# Patient Record
Sex: FEMALE | Race: WHITE | NOT HISPANIC OR LATINO | URBAN - METROPOLITAN AREA
[De-identification: names, ages, dates, MRNs, and addresses within clinical notes are randomized per-mention and may not be internally consistent; named-entity substitution may affect disease eponyms.]

---

## 2017-01-24 ENCOUNTER — ON CAMPUS - OUTPATIENT (AMBULATORY)
Dept: URBAN - METROPOLITAN AREA HOSPITAL 2 | Facility: HOSPITAL | Age: 62
End: 2017-01-24

## 2017-01-24 VITALS
SYSTOLIC BLOOD PRESSURE: 141 MMHG | OXYGEN SATURATION: 100 % | DIASTOLIC BLOOD PRESSURE: 69 MMHG | RESPIRATION RATE: 14 BRPM | SYSTOLIC BLOOD PRESSURE: 129 MMHG | DIASTOLIC BLOOD PRESSURE: 102 MMHG | HEART RATE: 76 BPM | DIASTOLIC BLOOD PRESSURE: 84 MMHG | OXYGEN SATURATION: 95 % | DIASTOLIC BLOOD PRESSURE: 71 MMHG | WEIGHT: 148 LBS | DIASTOLIC BLOOD PRESSURE: 73 MMHG | HEART RATE: 80 BPM | SYSTOLIC BLOOD PRESSURE: 144 MMHG | SYSTOLIC BLOOD PRESSURE: 143 MMHG | DIASTOLIC BLOOD PRESSURE: 74 MMHG | HEART RATE: 86 BPM | SYSTOLIC BLOOD PRESSURE: 146 MMHG | DIASTOLIC BLOOD PRESSURE: 78 MMHG | RESPIRATION RATE: 18 BRPM | HEART RATE: 88 BPM | TEMPERATURE: 97.9 F | OXYGEN SATURATION: 98 % | HEART RATE: 75 BPM | RESPIRATION RATE: 16 BRPM | HEART RATE: 109 BPM | HEART RATE: 77 BPM | OXYGEN SATURATION: 96 % | SYSTOLIC BLOOD PRESSURE: 133 MMHG | HEIGHT: 64 IN | DIASTOLIC BLOOD PRESSURE: 89 MMHG | OXYGEN SATURATION: 97 % | HEART RATE: 95 BPM | SYSTOLIC BLOOD PRESSURE: 145 MMHG | SYSTOLIC BLOOD PRESSURE: 162 MMHG | SYSTOLIC BLOOD PRESSURE: 147 MMHG

## 2017-01-24 DIAGNOSIS — Z86.010 PERSONAL HISTORY OF COLONIC POLYPS: ICD-10-CM

## 2017-01-24 DIAGNOSIS — R13.10 DYSPHAGIA, UNSPECIFIED: ICD-10-CM

## 2017-01-24 DIAGNOSIS — K21.9 GASTRO-ESOPHAGEAL REFLUX DISEASE WITHOUT ESOPHAGITIS: ICD-10-CM

## 2017-01-24 DIAGNOSIS — K64.1 SECOND DEGREE HEMORRHOIDS: ICD-10-CM

## 2017-01-24 PROCEDURE — 43450 DILATE ESOPHAGUS 1/MULT PASS: CPT | Performed by: INTERNAL MEDICINE

## 2017-01-24 PROCEDURE — 43235 EGD DIAGNOSTIC BRUSH WASH: CPT | Performed by: INTERNAL MEDICINE

## 2017-01-24 PROCEDURE — 45378 DIAGNOSTIC COLONOSCOPY: CPT | Mod: PT | Performed by: INTERNAL MEDICINE

## 2017-01-24 RX ADMIN — PROPOFOL: 10 INJECTION, EMULSION INTRAVENOUS at 08:47

## 2017-06-13 ENCOUNTER — HOSPITAL ENCOUNTER (OUTPATIENT)
Dept: ULTRASOUND IMAGING | Facility: HOSPITAL | Age: 62
Discharge: HOME | End: 2017-06-13
Attending: INTERNAL MEDICINE | Admitting: INTERNAL MEDICINE

## 2018-01-02 ENCOUNTER — HOSPITAL ENCOUNTER (OUTPATIENT)
Dept: MAMMOGRAPHY | Facility: HOSPITAL | Age: 63
Discharge: HOME | End: 2018-01-02
Attending: INTERNAL MEDICINE | Admitting: INTERNAL MEDICINE

## 2018-09-21 ENCOUNTER — HOSPITAL ENCOUNTER (OUTPATIENT)
Dept: ULTRASOUND IMAGING | Facility: HOSPITAL | Age: 63
Discharge: HOME | End: 2018-09-21
Attending: INTERNAL MEDICINE | Admitting: INTERNAL MEDICINE

## 2019-01-18 ENCOUNTER — HOSPITAL ENCOUNTER (OUTPATIENT)
Dept: MAMMOGRAPHY | Facility: HOSPITAL | Age: 64
Discharge: HOME | End: 2019-01-18
Attending: INTERNAL MEDICINE | Admitting: INTERNAL MEDICINE

## 2019-01-28 ENCOUNTER — HOSPITAL ENCOUNTER (OUTPATIENT)
Dept: MAMMOGRAPHY | Facility: HOSPITAL | Age: 64
Discharge: HOME | End: 2019-01-28
Attending: INTERNAL MEDICINE | Admitting: INTERNAL MEDICINE

## 2019-06-18 ENCOUNTER — TRANSCRIBE ORDERS (OUTPATIENT)
Dept: ADMINISTRATIVE | Facility: HOSPITAL | Age: 64
End: 2019-06-18

## 2019-06-18 DIAGNOSIS — R92.8 FOLLOW-UP EXAMINATION OF ABNORMAL MAMMOGRAM: Primary | ICD-10-CM

## 2019-07-24 ENCOUNTER — HOSPITAL ENCOUNTER (OUTPATIENT)
Dept: MAMMOGRAPHY | Facility: HOSPITAL | Age: 64
Discharge: HOME | End: 2019-07-24
Admitting: OBSTETRICS & GYNECOLOGY

## 2019-07-24 DIAGNOSIS — R92.8 FOLLOW-UP EXAMINATION OF ABNORMAL MAMMOGRAM: ICD-10-CM

## 2019-07-24 PROCEDURE — G0279 TOMOSYNTHESIS, MAMMO: HCPCS

## 2019-07-24 PROCEDURE — 77065 DX MAMMO INCL CAD UNI: CPT

## 2019-08-02 ENCOUNTER — TRANSCRIBE ORDERS (OUTPATIENT)
Dept: ADMINISTRATIVE | Facility: HOSPITAL | Age: 64
End: 2019-08-02

## 2019-08-02 DIAGNOSIS — N63.10 BREAST MASS, RIGHT: Primary | ICD-10-CM

## 2019-08-02 DIAGNOSIS — Z09 FOLLOW-UP EXAM, 3-6 MONTHS SINCE PREVIOUS EXAM: ICD-10-CM

## 2019-08-07 ENCOUNTER — APPOINTMENT (OUTPATIENT)
Dept: MAMMOGRAPHY | Facility: HOSPITAL | Age: 64
End: 2019-08-07

## 2019-10-17 ENCOUNTER — TRANSCRIBE ORDERS (OUTPATIENT)
Dept: PHYSICAL THERAPY | Facility: CLINIC | Age: 64
End: 2019-10-17

## 2019-10-17 DIAGNOSIS — S39.012A STRAIN OF LUMBAR REGION, INITIAL ENCOUNTER: ICD-10-CM

## 2019-10-17 DIAGNOSIS — M51.36 DDD (DEGENERATIVE DISC DISEASE), LUMBAR: Primary | ICD-10-CM

## 2019-10-28 ENCOUNTER — TREATMENT (OUTPATIENT)
Dept: PHYSICAL THERAPY | Facility: CLINIC | Age: 64
End: 2019-10-28

## 2019-10-28 DIAGNOSIS — M51.36 DDD (DEGENERATIVE DISC DISEASE), LUMBAR: Primary | ICD-10-CM

## 2019-10-28 PROCEDURE — 97162 PT EVAL MOD COMPLEX 30 MIN: CPT | Performed by: PHYSICAL THERAPIST

## 2019-10-28 PROCEDURE — 97110 THERAPEUTIC EXERCISES: CPT | Performed by: PHYSICAL THERAPIST

## 2019-10-28 PROCEDURE — 97012 MECHANICAL TRACTION THERAPY: CPT | Performed by: PHYSICAL THERAPIST

## 2019-10-28 NOTE — PROGRESS NOTES
"  Physical Therapy Initial Evaluation and Plan of Care    Patient: Courtney Reis   : 1955  Diagnosis/ICD-10 Code:  DDD (degenerative disc disease), lumbar [M51.36]  Referring practitioner: Kevin Nagel MD  Date of Initial Visit: 10/28/2019  Today's Date: 10/28/2019  Patient seen for 1 sessions           Subjective Questionnaire: Oswestry: 50%       Subjective Evaluation    History of Present Illness  Mechanism of injury: Patient presents physical therapy with chief complaint of lower back and LLE pain.  Patient states that she began having symptoms around the beginning of September.  Reports that she has noticed pain getting worse, as well as being worse at the end of the day.  Patient reports taking anti-inflammatory medication with some relief.  Patient reports prior \"pinched nerve\" in cervical spine, however no other lower back injuries.  Patient reports that she also has Lupus and Fibromyalgia.     Pain  Current pain ratin  At worst pain ratin  Quality: tight and cramping  Relieving factors: medications and heat  Aggravating factors: sleeping, prolonged positioning, squatting, lifting, movement and standing  Progression: worsening    Diagnostic Tests  No diagnostic tests performed    Patient Goals  Patient goals for therapy: decreased pain, return to sport/leisure activities and independence with ADLs/IADLs             Objective       Neurological Testing     Additional Neurological Details  Dermatomes:  Wnl     Active Range of Motion     Additional Active Range of Motion Details  Lumbar AROM:    Flexion:  Full AROM, pain on left in lumbar spine  L and R SB:  25% limited and painful both directions  Extension:  50% limited and painful     Strength/Myotome Testing     Left Hip   Planes of Motion   Flexion: 5  Extension: 5  Abduction: 5  Adduction: 5  External rotation: 5  Internal rotation: 5    Right Hip   Planes of Motion   Flexion: 5  Extension: 5  Abduction: 5  Adduction: 5  External " rotation: 5  Internal rotation: 5    Left Knee   Flexion: 5  Extension: 5    Right Knee   Flexion: 5  Extension: 5    Left Ankle/Foot   Dorsiflexion: 5  Plantar flexion: 5  Great toe extension: 4    Right Ankle/Foot   Dorsiflexion: 5  Plantar flexion: 5  Great toe extension: 4-    Tests     Lumbar     Left   Positive passive SLR and quadrant.     Right   Positive passive SLR and quadrant.     Additional Tests Details  TTP with UPA to right L4 and L5  TTP with CPA to L4 and L5         Assessment & Plan     Assessment  Impairments: abnormal or restricted ROM, impaired physical strength, lacks appropriate home exercise program and pain with function  Assessment details: Patient presents to physical therapy with s/s congruent with MD diagnosis of DDD of lumbar spine.  Patient demonstrates restricted and painful AROM.  Patient also demonstrates mild weakness in BLE's.  Patient reports diffculty with sleeping and most ADL's.  Patient would benefit from physical therapy intervention in order to address these deficits so that she may return to ADL's with less pain/limitation.  Prognosis: good  Functional Limitations: lifting, sleeping, walking, pulling, pushing, uncomfortable because of pain and standing  Goals  Plan Goals: In two weeks, patient will report at least 25% reduction in pain level.    In two weeks, patient will demonstrate at least 25% improvement in AROM in  lumbar spine.     In four weeks, patient will demonstrate compliance with HEP.  In four weeks, patient will demonstrate lumbar AROM WFL without pain level over 2/10.  In four weeks, patient will demonstrate muscular strength of 5/5 in BLE's.   In four weeks, patient will demonstrate decreased perceived disability by decreasing score on Oswestry by at least 12%.    Plan  Therapy options: will be seen for skilled physical therapy services  Planned modality interventions: traction, thermotherapy (hydrocollator packs), TENS and cryotherapy  Planned therapy  interventions: abdominal trunk stabilization, home exercise program, joint mobilization, manual therapy, neuromuscular re-education, soft tissue mobilization, spinal/joint mobilization and strengthening  Frequency: 2x week  Plan details: 30 visits or 90 day recertification.         Manual Therapy:         mins  25083;  Therapeutic Exercise:    15     mins  36177;     Neuromuscular Jazzmine:        mins  34871;    Therapeutic Activity:          mins  62524;     Gait Training:           mins  93306;     Ultrasound:          mins  50750;    Electrical Stimulation:         mins  05210 ( );  Dry Needling          mins self-pay  Traction   15 mins 78241    Timed Treatment:   15   mins   Total Treatment:     55   mins    PT SIGNATURE: Laith Vences PT   DATE TREATMENT INITIATED: 10/28/2019    Initial Certification  Certification Period: 1/26/2020  I certify that the therapy services are furnished while this patient is under my care.  The services outlined above are required by this patient, and will be reviewed every 90 days.     PHYSICIAN: Kevin Nagel MD      DATE:     Please sign and return via fax to [phone].. Thank you, [facility].

## 2019-10-30 ENCOUNTER — TREATMENT (OUTPATIENT)
Dept: PHYSICAL THERAPY | Facility: CLINIC | Age: 64
End: 2019-10-30

## 2019-10-30 DIAGNOSIS — M51.36 DDD (DEGENERATIVE DISC DISEASE), LUMBAR: Primary | ICD-10-CM

## 2019-10-30 PROCEDURE — 97110 THERAPEUTIC EXERCISES: CPT | Performed by: PHYSICAL THERAPIST

## 2019-10-30 PROCEDURE — 97012 MECHANICAL TRACTION THERAPY: CPT | Performed by: PHYSICAL THERAPIST

## 2019-10-30 NOTE — PROGRESS NOTES
Physical Therapy Daily Progress Note      Patient: Courtney Reis   : 1955  Diagnosis/ICD-10 Code:  DDD (degenerative disc disease), lumbar [M51.36]  Referring practitioner: Kevin Nagel MD  Date of Initial Visit: Type: THERAPY  Noted: 10/28/2019  Today's Date: 10/30/2019  Patient seen for 2 sessions         Courtney Reis reports:  Decreased pain in lumbar spine reported after previous treatment.  Still some discomfort in left leg and lumbar spine      Objective   See Exercise, Manual, and Modality Logs for complete treatment.       Assessment/Plan     Decreased pain reported post treatment.  Patient tolerates core stabilization exercise without increase in symptoms.     Progress per Plan of Care           Manual Therapy:         mins  79538;  Therapeutic Exercise:    10     mins  67952;     Neuromuscular Jazzmine:        mins  60068;    Therapeutic Activity:          mins  64549;     Gait Training:           mins  89937;     Ultrasound:          mins  52691;    Electrical Stimulation:         mins  71686 ( );  Dry Needling          mins self-pay  Traction  15  mins  25916    Timed Treatment:   25   mins   Total Treatment:     25   mins    Laith Vences PT  Physical Therapist

## 2019-11-04 ENCOUNTER — TREATMENT (OUTPATIENT)
Dept: PHYSICAL THERAPY | Facility: CLINIC | Age: 64
End: 2019-11-04

## 2019-11-04 DIAGNOSIS — M51.36 DDD (DEGENERATIVE DISC DISEASE), LUMBAR: Primary | ICD-10-CM

## 2019-11-04 PROCEDURE — 97012 MECHANICAL TRACTION THERAPY: CPT | Performed by: PHYSICAL THERAPIST

## 2019-11-04 PROCEDURE — G0283 ELEC STIM OTHER THAN WOUND: HCPCS | Performed by: PHYSICAL THERAPIST

## 2019-11-04 PROCEDURE — 97110 THERAPEUTIC EXERCISES: CPT | Performed by: PHYSICAL THERAPIST

## 2019-11-04 NOTE — PROGRESS NOTES
Physical Therapy Daily Progress Note    VISIT#: 3    Subjective   Courtney Reis reports: that traction didn't seem to help as much last time as the time before. She had a little more discomfort after this last visit.       Objective     See Exercise, Manual, and Modality Logs for complete treatment.       Assessment & Plan     Assessment  Assessment details: Pt wanted to continue traction this date and weight was lowered. Pt seemed to tolerated traction well. Ability to perform core stabilization improved with stabilizer and pt cued to isolate lower abdominals. Pt tolerated e-stim and heat well at end of session.           Progress per Plan of Care            Timed:         Manual Therapy:         mins  98391;     Therapeutic Exercise:    10     mins  97915;     Neuromuscular Jazzmine:        mins  33155;    Therapeutic Activity:          mins  67423;     Gait Training:           mins  15631;     Ultrasound:          mins  90748;    Ionto                                   mins   68029  Self Care                            mins   43907    Un-Timed:  Electrical Stimulation:    15     mins  73659 ( );  Traction     15     mins 33059    Timed Treatment:   10   mins   Total Treatment:     40   mins    Puja Jeffries, PT, DPT, OCS  [place] License # [id number]  Physical Therapist

## 2019-11-06 ENCOUNTER — TREATMENT (OUTPATIENT)
Dept: PHYSICAL THERAPY | Facility: CLINIC | Age: 64
End: 2019-11-06

## 2019-11-06 DIAGNOSIS — M51.36 DDD (DEGENERATIVE DISC DISEASE), LUMBAR: Primary | ICD-10-CM

## 2019-11-06 PROCEDURE — G0283 ELEC STIM OTHER THAN WOUND: HCPCS | Performed by: PHYSICAL THERAPIST

## 2019-11-06 PROCEDURE — 97012 MECHANICAL TRACTION THERAPY: CPT | Performed by: PHYSICAL THERAPIST

## 2019-11-06 NOTE — PROGRESS NOTES
Physical Therapy Daily Progress Note    VISIT#: 4    Subjective   Courtney Reis reports: that she is hurting this morning because mornings are usually the worst. She felt better after last time compared to the time before and would like to do lower weight on traction again.       Objective     See Exercise, Manual, and Modality Logs for complete treatment.       Assessment & Plan     Assessment  Assessment details: Pt demonstrates some decreased pain after traction. Pt tolerated treatment well. Pt performed exercises at home this morning and did not want to do them at therapy today.           Progress per Plan of Care            Timed:         Manual Therapy:         mins  73244;     Therapeutic Exercise:        mins  68711;     Neuromuscular Jazzmine:        mins  48393;    Therapeutic Activity:          mins  09523;     Gait Training:           mins  86035;     Ultrasound:          mins  24717;    Ionto                                   mins   73198  Self Care                            mins   92565    Un-Timed:  Electrical Stimulation:    15     mins  92790 ( );  Traction     15     mins 52773    Timed Treatment:   0  mins   Total Treatment:     30   mins    Puja Jeffries, PT, DPT, OCS  [place] License # [id number]  Physical Therapist

## 2019-11-11 ENCOUNTER — TREATMENT (OUTPATIENT)
Dept: PHYSICAL THERAPY | Facility: CLINIC | Age: 64
End: 2019-11-11

## 2019-11-11 DIAGNOSIS — M51.36 DDD (DEGENERATIVE DISC DISEASE), LUMBAR: Primary | ICD-10-CM

## 2019-11-11 PROCEDURE — 97012 MECHANICAL TRACTION THERAPY: CPT | Performed by: PHYSICAL THERAPIST

## 2019-11-11 NOTE — PROGRESS NOTES
Physical Therapy Daily Progress Note    Patient: Courtney Reis   : 1955  Diagnosis/ICD-10 Code:  DDD (degenerative disc disease), lumbar [M51.36]  Referring practitioner: Kevin Nagel MD  Date of Initial Visit: Type: THERAPY  Noted: 10/28/2019  Today's Date: 2019  Patient seen for 5 sessions         Courtney Reis reports:  Feeling better after last week.  Reports that she has appointment with MD today and wishes to only complete mechanical traction treatment today.  Patient reports that she has MRI scheduled for this week to confirm diagnosis.     Objective   See Exercise, Manual, and Modality Logs for complete treatment.       Assessment/Plan     Tolerates traction well.  Patient on track to meeting pain goals.  Patient also compliant with HEP.   Progress per Plan of Care           Manual Therapy:         mins  96023;  Therapeutic Exercise:         mins  42771;     Neuromuscular Jazzmine:        mins  51411;    Therapeutic Activity:          mins  50374;     Gait Training:           mins  23934;     Ultrasound:          mins  71207;    Electrical Stimulation:         mins  80561 ( );  Dry Needling          mins self-pay  Traction  15  mins 46691    Timed Treatment:   0   mins   Total Treatment:     15   mins    Laith Vences PT  Physical Therapist

## 2019-11-20 ENCOUNTER — TREATMENT (OUTPATIENT)
Dept: PHYSICAL THERAPY | Facility: CLINIC | Age: 64
End: 2019-11-20

## 2019-11-20 DIAGNOSIS — M51.36 DDD (DEGENERATIVE DISC DISEASE), LUMBAR: Primary | ICD-10-CM

## 2019-11-20 PROCEDURE — 97110 THERAPEUTIC EXERCISES: CPT | Performed by: PHYSICAL THERAPIST

## 2019-11-20 PROCEDURE — 97012 MECHANICAL TRACTION THERAPY: CPT | Performed by: PHYSICAL THERAPIST

## 2019-11-20 NOTE — PROGRESS NOTES
Physical Therapy Daily Progress Note      Patient: Courtney Reis   : 1955  Diagnosis/ICD-10 Code:  DDD (degenerative disc disease), lumbar [M51.36]  Referring practitioner: Kevin Nagel MD  Date of Initial Visit: Type: THERAPY  Noted: 10/28/2019  Today's Date: 2019  Patient seen for 6 sessions         Courtney Reis reports:  Getting MRI and steroid injection last week.  Reports feeling better after previous PT treatment, as well as decreased pain level today.  Reports progressing from PPT to PPT with alternating leg marches exercise on HEP this week.     Objective   See Exercise, Manual, and Modality Logs for complete treatment.       Assessment/Plan     Patient demonstrates proper technique with home exercises.  Progressing well with exercise and traction.      Progress per Plan of Care           Manual Therapy:         mins  46776;  Therapeutic Exercise:    15     mins  21457;     Neuromuscular Jazzmine:        mins  33376;    Therapeutic Activity:          mins  50666;     Gait Training:           mins  53158;     Ultrasound:          mins  19838;    Electrical Stimulation:         mins  05223 (MC );  Dry Needling          mins self-pay  Traction  15 mins 61941    Timed Treatment:   15   mins   Total Treatment:     30   mins    Laith Vences PT  Physical Therapist

## 2019-12-04 ENCOUNTER — TREATMENT (OUTPATIENT)
Dept: PHYSICAL THERAPY | Facility: CLINIC | Age: 64
End: 2019-12-04

## 2019-12-04 DIAGNOSIS — M51.36 DDD (DEGENERATIVE DISC DISEASE), LUMBAR: Primary | ICD-10-CM

## 2019-12-04 PROCEDURE — 97012 MECHANICAL TRACTION THERAPY: CPT | Performed by: PHYSICAL THERAPIST

## 2019-12-04 NOTE — PROGRESS NOTES
Physical Therapy Daily Progress Note      Patient: Courtney Reis   : 1955  Diagnosis/ICD-10 Code:  DDD (degenerative disc disease), lumbar [M51.36]  Referring practitioner: Kevin Nagel MD  Date of Initial Visit: Type: THERAPY  Noted: 10/28/2019  Today's Date: 2019  Patient seen for 7 sessions         Courtney Reis reports:  Lower back has not gotten worse nor better.  Reports continued pain radiating in right hip.      Objective   See Exercise, Manual, and Modality Logs for complete treatment.       Assessment/Plan     Tolerates traction treatment well this visit.  Reviewed HEP and patient demonstrates compliance.     Progress per Plan of Care           Manual Therapy:         mins  44557;  Therapeutic Exercise:   5     mins  26371;     Neuromuscular Jazzmine:        mins  64330;    Therapeutic Activity:          mins  17282;     Gait Training:           mins  52562;     Ultrasound:          mins  10828;    Electrical Stimulation:         mins  69273 ( );  Dry Needling          mins self-pay  Traction  15 mins  32904    Timed Treatment:   5   mins   Total Treatment:     20   mins    Laith Vences PT  Physical Therapist

## 2020-06-24 ENCOUNTER — TRANSCRIBE ORDERS (OUTPATIENT)
Dept: ADMINISTRATIVE | Facility: HOSPITAL | Age: 65
End: 2020-06-24

## 2020-06-24 DIAGNOSIS — Z12.31 VISIT FOR SCREENING MAMMOGRAM: Primary | ICD-10-CM

## 2020-06-29 ENCOUNTER — APPOINTMENT (OUTPATIENT)
Dept: MAMMOGRAPHY | Facility: HOSPITAL | Age: 65
End: 2020-06-29

## 2020-07-02 ENCOUNTER — HOSPITAL ENCOUNTER (OUTPATIENT)
Dept: MAMMOGRAPHY | Facility: HOSPITAL | Age: 65
Discharge: HOME | End: 2020-07-02
Admitting: OBSTETRICS & GYNECOLOGY

## 2020-07-02 DIAGNOSIS — Z12.31 VISIT FOR SCREENING MAMMOGRAM: ICD-10-CM

## 2020-07-02 PROCEDURE — 77063 BREAST TOMOSYNTHESIS BI: CPT

## 2020-07-02 PROCEDURE — 77067 SCR MAMMO BI INCL CAD: CPT

## 2021-01-22 ENCOUNTER — TRANSCRIBE ORDERS (OUTPATIENT)
Dept: ADMINISTRATIVE | Facility: HOSPITAL | Age: 66
End: 2021-01-22

## 2021-01-22 DIAGNOSIS — E04.1 THYROID NODULE: Primary | ICD-10-CM

## 2021-06-22 ENCOUNTER — TRANSCRIBE ORDERS (OUTPATIENT)
Dept: ADMINISTRATIVE | Facility: HOSPITAL | Age: 66
End: 2021-06-22

## 2021-06-22 DIAGNOSIS — Z12.31 VISIT FOR SCREENING MAMMOGRAM: Primary | ICD-10-CM

## 2021-07-05 ENCOUNTER — HOSPITAL ENCOUNTER (OUTPATIENT)
Dept: MAMMOGRAPHY | Facility: HOSPITAL | Age: 66
Discharge: HOME | End: 2021-07-05
Admitting: OBSTETRICS & GYNECOLOGY

## 2021-07-05 DIAGNOSIS — Z12.31 VISIT FOR SCREENING MAMMOGRAM: ICD-10-CM

## 2021-07-05 PROCEDURE — 77067 SCR MAMMO BI INCL CAD: CPT

## 2021-07-05 PROCEDURE — 77063 BREAST TOMOSYNTHESIS BI: CPT

## 2021-07-09 ENCOUNTER — HOSPITAL ENCOUNTER (OUTPATIENT)
Dept: MAMMOGRAPHY | Facility: HOSPITAL | Age: 66
Discharge: HOME | End: 2021-07-09

## 2021-07-09 ENCOUNTER — HOSPITAL ENCOUNTER (OUTPATIENT)
Dept: ULTRASOUND IMAGING | Facility: HOSPITAL | Age: 66
Discharge: HOME | End: 2021-07-09

## 2021-07-09 DIAGNOSIS — R92.8 ABNORMALITY OF BOTH BREASTS ON SCREENING MAMMOGRAM: ICD-10-CM

## 2021-07-09 PROCEDURE — 76642 ULTRASOUND BREAST LIMITED: CPT

## 2021-07-09 PROCEDURE — G0279 TOMOSYNTHESIS, MAMMO: HCPCS

## 2021-07-09 PROCEDURE — 77066 DX MAMMO INCL CAD BI: CPT

## 2021-07-12 ENCOUNTER — APPOINTMENT (OUTPATIENT)
Dept: ULTRASOUND IMAGING | Facility: HOSPITAL | Age: 66
End: 2021-07-12

## 2021-12-01 ENCOUNTER — LAB (OUTPATIENT)
Dept: LAB | Facility: HOSPITAL | Age: 66
End: 2021-12-01

## 2021-12-01 ENCOUNTER — TRANSCRIBE ORDERS (OUTPATIENT)
Dept: ADMINISTRATIVE | Facility: HOSPITAL | Age: 66
End: 2021-12-01

## 2021-12-01 DIAGNOSIS — Z11.52 ENCOUNTER FOR SCREENING FOR SEVERE ACUTE RESPIRATORY SYNDROME CORONAVIRUS 2 (SARS-COV-2) INFECTION: Primary | ICD-10-CM

## 2021-12-01 DIAGNOSIS — Z11.52 ENCOUNTER FOR SCREENING FOR SEVERE ACUTE RESPIRATORY SYNDROME CORONAVIRUS 2 (SARS-COV-2) INFECTION: ICD-10-CM

## 2021-12-01 PROCEDURE — C9803 HOPD COVID-19 SPEC COLLECT: HCPCS

## 2021-12-01 PROCEDURE — U0004 COV-19 TEST NON-CDC HGH THRU: HCPCS

## 2021-12-02 LAB — SARS-COV-2 ORF1AB RESP QL NAA+PROBE: NOT DETECTED

## 2022-04-06 ENCOUNTER — ON CAMPUS - OUTPATIENT (AMBULATORY)
Dept: URBAN - METROPOLITAN AREA HOSPITAL 2 | Facility: HOSPITAL | Age: 67
End: 2022-04-06
Payer: MEDICARE

## 2022-04-06 VITALS
OXYGEN SATURATION: 96 % | HEART RATE: 79 BPM | RESPIRATION RATE: 18 BRPM | HEIGHT: 64 IN | DIASTOLIC BLOOD PRESSURE: 68 MMHG | OXYGEN SATURATION: 100 % | HEART RATE: 91 BPM | SYSTOLIC BLOOD PRESSURE: 123 MMHG | SYSTOLIC BLOOD PRESSURE: 183 MMHG | OXYGEN SATURATION: 94 % | DIASTOLIC BLOOD PRESSURE: 72 MMHG | SYSTOLIC BLOOD PRESSURE: 126 MMHG | HEART RATE: 75 BPM | HEART RATE: 61 BPM | HEART RATE: 81 BPM | SYSTOLIC BLOOD PRESSURE: 128 MMHG | DIASTOLIC BLOOD PRESSURE: 70 MMHG | DIASTOLIC BLOOD PRESSURE: 85 MMHG | OXYGEN SATURATION: 99 % | SYSTOLIC BLOOD PRESSURE: 155 MMHG | SYSTOLIC BLOOD PRESSURE: 146 MMHG | DIASTOLIC BLOOD PRESSURE: 88 MMHG | SYSTOLIC BLOOD PRESSURE: 114 MMHG | DIASTOLIC BLOOD PRESSURE: 80 MMHG | OXYGEN SATURATION: 98 % | SYSTOLIC BLOOD PRESSURE: 151 MMHG | HEART RATE: 83 BPM | DIASTOLIC BLOOD PRESSURE: 76 MMHG | RESPIRATION RATE: 17 BRPM | RESPIRATION RATE: 16 BRPM | HEART RATE: 77 BPM | HEART RATE: 71 BPM | HEART RATE: 98 BPM | TEMPERATURE: 97.5 F | DIASTOLIC BLOOD PRESSURE: 94 MMHG | SYSTOLIC BLOOD PRESSURE: 129 MMHG | WEIGHT: 152 LBS | SYSTOLIC BLOOD PRESSURE: 161 MMHG | RESPIRATION RATE: 12 BRPM

## 2022-04-06 DIAGNOSIS — R13.10 DYSPHAGIA, UNSPECIFIED: ICD-10-CM

## 2022-04-06 DIAGNOSIS — Z86.010 PERSONAL HISTORY OF COLONIC POLYPS: ICD-10-CM

## 2022-04-06 DIAGNOSIS — K21.9 GASTRO-ESOPHAGEAL REFLUX DISEASE WITHOUT ESOPHAGITIS: ICD-10-CM

## 2022-04-06 DIAGNOSIS — K64.1 SECOND DEGREE HEMORRHOIDS: ICD-10-CM

## 2022-04-06 PROCEDURE — 43235 EGD DIAGNOSTIC BRUSH WASH: CPT | Performed by: INTERNAL MEDICINE

## 2022-04-06 PROCEDURE — 43450 DILATE ESOPHAGUS 1/MULT PASS: CPT | Performed by: INTERNAL MEDICINE

## 2022-04-06 PROCEDURE — G0105 COLORECTAL SCRN; HI RISK IND: HCPCS | Performed by: INTERNAL MEDICINE

## 2022-09-01 ENCOUNTER — TRANSCRIBE ORDERS (OUTPATIENT)
Dept: ADMINISTRATIVE | Facility: HOSPITAL | Age: 67
End: 2022-09-01

## 2022-09-01 DIAGNOSIS — Z12.31 SCREENING MAMMOGRAM FOR BREAST CANCER: Primary | ICD-10-CM

## 2022-09-13 ENCOUNTER — HOSPITAL ENCOUNTER (OUTPATIENT)
Dept: MAMMOGRAPHY | Facility: HOSPITAL | Age: 67
Discharge: HOME | End: 2022-09-13
Admitting: OBSTETRICS & GYNECOLOGY

## 2022-09-13 DIAGNOSIS — Z12.31 SCREENING MAMMOGRAM FOR BREAST CANCER: ICD-10-CM

## 2022-09-13 PROCEDURE — 77063 BREAST TOMOSYNTHESIS BI: CPT

## 2022-09-13 PROCEDURE — 77067 SCR MAMMO BI INCL CAD: CPT

## 2022-11-07 ENCOUNTER — TRANSCRIBE ORDERS (OUTPATIENT)
Dept: ADMINISTRATIVE | Facility: HOSPITAL | Age: 67
End: 2022-11-07

## 2022-11-07 DIAGNOSIS — E04.1 THYROID NODULE: Primary | ICD-10-CM

## 2022-11-15 ENCOUNTER — HOSPITAL ENCOUNTER (OUTPATIENT)
Dept: ULTRASOUND IMAGING | Facility: HOSPITAL | Age: 67
Discharge: HOME | End: 2022-11-15
Admitting: INTERNAL MEDICINE

## 2022-11-15 DIAGNOSIS — E04.1 THYROID NODULE: ICD-10-CM

## 2022-11-15 PROCEDURE — 76536 US EXAM OF HEAD AND NECK: CPT

## 2022-11-23 ENCOUNTER — TRANSCRIBE ORDERS (OUTPATIENT)
Dept: ADMINISTRATIVE | Facility: HOSPITAL | Age: 67
End: 2022-11-23

## 2022-11-23 DIAGNOSIS — E04.1 THYROID NODULE: Primary | ICD-10-CM

## 2023-09-07 ENCOUNTER — TRANSCRIBE ORDERS (OUTPATIENT)
Dept: ADMINISTRATIVE | Facility: HOSPITAL | Age: 68
End: 2023-09-07
Payer: MEDICARE

## 2023-09-07 DIAGNOSIS — Z78.0 POSTMENOPAUSAL: ICD-10-CM

## 2023-09-07 DIAGNOSIS — Z12.31 VISIT FOR SCREENING MAMMOGRAM: Primary | ICD-10-CM

## 2023-10-03 ENCOUNTER — HOSPITAL ENCOUNTER (OUTPATIENT)
Dept: BONE DENSITY | Facility: HOSPITAL | Age: 68
Discharge: HOME | End: 2023-10-03
Payer: MEDICARE

## 2023-10-03 ENCOUNTER — HOSPITAL ENCOUNTER (OUTPATIENT)
Dept: MAMMOGRAPHY | Facility: HOSPITAL | Age: 68
Discharge: HOME | End: 2023-10-03
Payer: MEDICARE

## 2023-10-03 DIAGNOSIS — Z12.31 VISIT FOR SCREENING MAMMOGRAM: ICD-10-CM

## 2023-10-03 DIAGNOSIS — Z78.0 POSTMENOPAUSAL: ICD-10-CM

## 2023-10-03 PROCEDURE — 77063 BREAST TOMOSYNTHESIS BI: CPT

## 2023-10-03 PROCEDURE — 77067 SCR MAMMO BI INCL CAD: CPT

## 2023-10-03 PROCEDURE — 77080 DXA BONE DENSITY AXIAL: CPT

## 2023-10-25 ENCOUNTER — HOSPITAL ENCOUNTER (OUTPATIENT)
Dept: ULTRASOUND IMAGING | Facility: HOSPITAL | Age: 68
Discharge: HOME | End: 2023-10-25
Admitting: INTERNAL MEDICINE
Payer: MEDICARE

## 2023-10-25 DIAGNOSIS — E04.1 THYROID NODULE: ICD-10-CM

## 2023-10-25 PROCEDURE — 76536 US EXAM OF HEAD AND NECK: CPT

## 2023-12-01 ENCOUNTER — TELEPHONE (OUTPATIENT)
Dept: FAMILY MEDICINE CLINIC | Facility: CLINIC | Age: 68
End: 2023-12-01
Payer: MEDICARE

## 2023-12-01 NOTE — TELEPHONE ENCOUNTER
LM ON VM TELLING HER THAT DR GUTIERREZ SAID SHE WOULD ACCEPT HER AS A NEW PATIENT, BUT HER SCHEDULE IS TOO BUSY RIGHT NOW, SO SHE WILL HAVE TO SCHEDULE FOR FEBRUARY.    OK FOR HUB TO RELAY

## 2024-03-11 ENCOUNTER — OFFICE VISIT (OUTPATIENT)
Dept: FAMILY MEDICINE CLINIC | Facility: CLINIC | Age: 69
End: 2024-03-11
Payer: MEDICARE

## 2024-03-11 ENCOUNTER — LAB (OUTPATIENT)
Dept: FAMILY MEDICINE CLINIC | Facility: CLINIC | Age: 69
End: 2024-03-11
Payer: MEDICARE

## 2024-03-11 VITALS
TEMPERATURE: 97.5 F | SYSTOLIC BLOOD PRESSURE: 157 MMHG | OXYGEN SATURATION: 99 % | HEART RATE: 92 BPM | WEIGHT: 153.4 LBS | DIASTOLIC BLOOD PRESSURE: 83 MMHG | HEIGHT: 64 IN | RESPIRATION RATE: 16 BRPM | BODY MASS INDEX: 26.19 KG/M2

## 2024-03-11 DIAGNOSIS — E53.8 B12 DEFICIENCY: Primary | ICD-10-CM

## 2024-03-11 DIAGNOSIS — E78.2 MIXED HYPERLIPIDEMIA: ICD-10-CM

## 2024-03-11 DIAGNOSIS — E53.8 VITAMIN B12 DEFICIENCY: ICD-10-CM

## 2024-03-11 DIAGNOSIS — M32.9 SYSTEMIC LUPUS ERYTHEMATOSUS, UNSPECIFIED SLE TYPE, UNSPECIFIED ORGAN INVOLVEMENT STATUS: ICD-10-CM

## 2024-03-11 DIAGNOSIS — E03.9 ACQUIRED HYPOTHYROIDISM: Primary | ICD-10-CM

## 2024-03-11 DIAGNOSIS — Z79.890 HORMONE REPLACEMENT THERAPY (HRT): ICD-10-CM

## 2024-03-11 DIAGNOSIS — Z12.11 SCREEN FOR COLON CANCER: ICD-10-CM

## 2024-03-11 LAB
ALBUMIN SERPL-MCNC: 4.8 G/DL (ref 3.5–5.2)
ALBUMIN/GLOB SERPL: 2.1 G/DL
ALP SERPL-CCNC: 102 U/L (ref 39–117)
ALT SERPL W P-5'-P-CCNC: 21 U/L (ref 1–33)
ANION GAP SERPL CALCULATED.3IONS-SCNC: 8 MMOL/L (ref 5–15)
AST SERPL-CCNC: 23 U/L (ref 1–32)
BASOPHILS # BLD AUTO: 0.04 10*3/MM3 (ref 0–0.2)
BASOPHILS NFR BLD AUTO: 0.8 % (ref 0–1.5)
BILIRUB SERPL-MCNC: 0.3 MG/DL (ref 0–1.2)
BUN SERPL-MCNC: 26 MG/DL (ref 8–23)
BUN/CREAT SERPL: 30.6 (ref 7–25)
CALCIUM SPEC-SCNC: 9.7 MG/DL (ref 8.6–10.5)
CHLORIDE SERPL-SCNC: 104 MMOL/L (ref 98–107)
CHOLEST SERPL-MCNC: 210 MG/DL (ref 0–200)
CO2 SERPL-SCNC: 28 MMOL/L (ref 22–29)
CREAT SERPL-MCNC: 0.85 MG/DL (ref 0.57–1)
DEPRECATED RDW RBC AUTO: 41.3 FL (ref 37–54)
EGFRCR SERPLBLD CKD-EPI 2021: 75.2 ML/MIN/1.73
EOSINOPHIL # BLD AUTO: 0.1 10*3/MM3 (ref 0–0.4)
EOSINOPHIL NFR BLD AUTO: 2.1 % (ref 0.3–6.2)
ERYTHROCYTE [DISTWIDTH] IN BLOOD BY AUTOMATED COUNT: 13 % (ref 12.3–15.4)
GLOBULIN UR ELPH-MCNC: 2.3 GM/DL
GLUCOSE SERPL-MCNC: 95 MG/DL (ref 65–99)
HCT VFR BLD AUTO: 42.5 % (ref 34–46.6)
HDLC SERPL-MCNC: 73 MG/DL (ref 40–60)
HGB BLD-MCNC: 14 G/DL (ref 12–15.9)
IMM GRANULOCYTES # BLD AUTO: 0.01 10*3/MM3 (ref 0–0.05)
IMM GRANULOCYTES NFR BLD AUTO: 0.2 % (ref 0–0.5)
LDLC SERPL CALC-MCNC: 120 MG/DL (ref 0–100)
LDLC/HDLC SERPL: 1.62 {RATIO}
LYMPHOCYTES # BLD AUTO: 1.64 10*3/MM3 (ref 0.7–3.1)
LYMPHOCYTES NFR BLD AUTO: 33.7 % (ref 19.6–45.3)
MCH RBC QN AUTO: 28.7 PG (ref 26.6–33)
MCHC RBC AUTO-ENTMCNC: 32.9 G/DL (ref 31.5–35.7)
MCV RBC AUTO: 87.1 FL (ref 79–97)
MONOCYTES # BLD AUTO: 0.53 10*3/MM3 (ref 0.1–0.9)
MONOCYTES NFR BLD AUTO: 10.9 % (ref 5–12)
NEUTROPHILS NFR BLD AUTO: 2.55 10*3/MM3 (ref 1.7–7)
NEUTROPHILS NFR BLD AUTO: 52.3 % (ref 42.7–76)
NRBC BLD AUTO-RTO: 0 /100 WBC (ref 0–0.2)
PLATELET # BLD AUTO: 247 10*3/MM3 (ref 140–450)
PMV BLD AUTO: 11.3 FL (ref 6–12)
POTASSIUM SERPL-SCNC: 4.4 MMOL/L (ref 3.5–5.2)
PROT SERPL-MCNC: 7.1 G/DL (ref 6–8.5)
RBC # BLD AUTO: 4.88 10*6/MM3 (ref 3.77–5.28)
SODIUM SERPL-SCNC: 140 MMOL/L (ref 136–145)
T4 FREE SERPL-MCNC: 1.51 NG/DL (ref 0.93–1.7)
TRIGL SERPL-MCNC: 95 MG/DL (ref 0–150)
TSH SERPL DL<=0.05 MIU/L-ACNC: 1.75 UIU/ML (ref 0.27–4.2)
VIT B12 BLD-MCNC: 478 PG/ML (ref 211–946)
VLDLC SERPL-MCNC: 17 MG/DL (ref 5–40)
WBC NRBC COR # BLD AUTO: 4.87 10*3/MM3 (ref 3.4–10.8)

## 2024-03-11 PROCEDURE — 84439 ASSAY OF FREE THYROXINE: CPT | Performed by: FAMILY MEDICINE

## 2024-03-11 PROCEDURE — 82607 VITAMIN B-12: CPT | Performed by: FAMILY MEDICINE

## 2024-03-11 PROCEDURE — 84443 ASSAY THYROID STIM HORMONE: CPT | Performed by: FAMILY MEDICINE

## 2024-03-11 PROCEDURE — 80061 LIPID PANEL: CPT | Performed by: FAMILY MEDICINE

## 2024-03-11 PROCEDURE — 80053 COMPREHEN METABOLIC PANEL: CPT | Performed by: FAMILY MEDICINE

## 2024-03-11 PROCEDURE — 85025 COMPLETE CBC W/AUTO DIFF WBC: CPT | Performed by: FAMILY MEDICINE

## 2024-03-11 PROCEDURE — 36415 COLL VENOUS BLD VENIPUNCTURE: CPT | Performed by: FAMILY MEDICINE

## 2024-03-11 RX ORDER — MELOXICAM 7.5 MG/1
7.5 TABLET ORAL DAILY
COMMUNITY
End: 2024-03-11 | Stop reason: SDUPTHER

## 2024-03-11 RX ORDER — HYDROCODONE BITARTRATE AND ACETAMINOPHEN 5; 325 MG/1; MG/1
1 TABLET ORAL DAILY PRN
COMMUNITY

## 2024-03-11 RX ORDER — LEVOTHYROXINE SODIUM 75 MCG
75 TABLET ORAL DAILY
Qty: 90 TABLET | Refills: 1 | Status: SHIPPED | OUTPATIENT
Start: 2024-03-11

## 2024-03-11 RX ORDER — CYANOCOBALAMIN 1000 UG/ML
1000 INJECTION, SOLUTION INTRAMUSCULAR; SUBCUTANEOUS
COMMUNITY
End: 2024-03-11

## 2024-03-11 RX ORDER — HYDROXYCHLOROQUINE SULFATE 200 MG/1
200 TABLET, FILM COATED ORAL DAILY
COMMUNITY

## 2024-03-11 RX ORDER — LEVOTHYROXINE SODIUM 0.07 MG/1
75 TABLET ORAL DAILY
COMMUNITY
End: 2024-03-11

## 2024-03-11 RX ORDER — CYANOCOBALAMIN 1000 UG/ML
1000 INJECTION, SOLUTION INTRAMUSCULAR; SUBCUTANEOUS
Status: DISCONTINUED | OUTPATIENT
Start: 2024-03-11 | End: 2024-03-11

## 2024-03-11 RX ORDER — MELOXICAM 7.5 MG/1
7.5 TABLET ORAL DAILY
Qty: 90 TABLET | Refills: 1 | Status: SHIPPED | OUTPATIENT
Start: 2024-03-11

## 2024-03-11 RX ORDER — CYANOCOBALAMIN 1000 UG/ML
1000 INJECTION, SOLUTION INTRAMUSCULAR; SUBCUTANEOUS
Status: SHIPPED | OUTPATIENT
Start: 2024-03-11

## 2024-03-11 RX ORDER — ESTRADIOL 0.05 MG/D
1 PATCH TRANSDERMAL WEEKLY
COMMUNITY

## 2024-03-11 RX ORDER — CYCLOBENZAPRINE HCL 10 MG
10 TABLET ORAL 3 TIMES DAILY PRN
COMMUNITY

## 2024-03-11 RX ADMIN — CYANOCOBALAMIN 1000 MCG: 1000 INJECTION, SOLUTION INTRAMUSCULAR; SUBCUTANEOUS at 09:30

## 2024-03-11 RX ADMIN — CYANOCOBALAMIN 1000 MCG: 1000 INJECTION, SOLUTION INTRAMUSCULAR; SUBCUTANEOUS at 09:43

## 2024-03-11 NOTE — PROGRESS NOTES
Subjective   Chief Complaint   Patient presents with    Established CAre    Hypothyroidism    Lupus    Med Refill    Osteoarthritis     Courtney Reis is a 68 y.o. female.     Patient Care Team:  Effie Quinteros MD as PCP - General (Family Medicine)  Eugenie Sanchez MD as Consulting Physician (Rheumatology)  Carine Brown MD as Consulting Physician (Endocrinology)    History of Present Illness  She is coming in today to get established and also discuss and review her chronic medical issues.  Patient is under the care of of the endocrinologist due to hypothyroidism and she also is being followed due to thyroid nodules.  She is also under the care of the rheumatologist for the treatment of the lupus.  She is currently on Plaquenil and has been on this for quite some time.  She tells me that in the past she also was on methotrexate which was already discontinued.  Patient was diagnosed with pernicious anemia several years ago and she has been on vitamin B12 shots once a month for several years.  She is also on hormone replacement therapy which has been provided by her gynecologist.  Patient wonders if all of these prescriptions from this point on can be coming from here.  She also gets periodically hydrocodone from her primary care doctor, but she takes it very infrequently and not on daily basis.       The following portions of the patient's history were reviewed and updated as appropriate: allergies, current medications, past family history, past medical history, past social history, past surgical history, and problem list.  Past Medical History:   Diagnosis Date    Anemia     Disease of thyroid gland     Fibromyalgia     Hypothyroidism     Lupus     Pernicious anemia      Past Surgical History:   Procedure Laterality Date    ANKLE SURGERY      right ankle    HYSTERECTOMY      LUMBAR SPINE SURGERY  2019     The patient has a family history of  Family History   Problem Relation Age of Onset    Breast cancer  "Mother     Breast cancer Cousin     Colon cancer Brother      Social History     Socioeconomic History    Marital status:    Tobacco Use    Smoking status: Never     Passive exposure: Never    Smokeless tobacco: Never   Vaping Use    Vaping status: Never Used   Substance and Sexual Activity    Alcohol use: Yes     Alcohol/week: 1.0 standard drink of alcohol     Types: 1 Glasses of wine per week     Comment: Social    Drug use: Never    Sexual activity: Defer       Review of Systems   Constitutional:  Negative for activity change, fatigue and fever.   Respiratory:  Negative for cough, shortness of breath and wheezing.    Cardiovascular:  Negative for chest pain, palpitations and leg swelling.   Gastrointestinal:  Negative for constipation, diarrhea and indigestion.   Skin:  Negative for color change, dry skin and rash.   Neurological:  Negative for tremors and headache.     Visit Vitals  /83 (BP Location: Left arm, Patient Position: Sitting, Cuff Size: Adult)   Pulse 92   Temp 97.5 °F (36.4 °C) (Infrared)   Resp 16   Ht 162.6 cm (64\")   Wt 69.6 kg (153 lb 6.4 oz)   SpO2 99%   BMI 26.33 kg/m²       BMI cannot be calculated due to outdated height or weight values.  Please input a current height/weight in Vitals and re-renter BMIFOLLOWUP in Note to pull in correct documentation based on BMI range.      Current Outpatient Medications:     cyclobenzaprine (FLEXERIL) 10 MG tablet, Take 1 tablet by mouth 3 (Three) Times a Day As Needed for Muscle Spasms., Disp: , Rfl:     estradiol (CLIMARA) 0.05 MG/24HR patch, Place 1 patch on the skin as directed by provider 1 (One) Time Per Week., Disp: , Rfl:     HYDROcodone-acetaminophen (NORCO) 5-325 MG per tablet, Take 1 tablet by mouth Daily As Needed., Disp: , Rfl:     hydroxychloroquine (PLAQUENIL) 200 MG tablet, Take 1 tablet by mouth Daily. One and a half, Disp: , Rfl:     meloxicam (MOBIC) 7.5 MG tablet, Take 1 tablet by mouth Daily., Disp: 90 tablet, Rfl: 1    " Synthroid 75 MCG tablet, Take 1 tablet by mouth Daily., Disp: 90 tablet, Rfl: 1    Current Facility-Administered Medications:     cyanocobalamin injection 1,000 mcg, 1,000 mcg, Intramuscular, Q28 Days, Effie Quinteros MD, 1,000 mcg at 03/11/24 0930    Objective   Physical Exam  Vitals and nursing note reviewed.   Constitutional:       General: She is not in acute distress.     Appearance: Normal appearance. She is well-developed. She is not ill-appearing.   HENT:      Head: Normocephalic and atraumatic.   Cardiovascular:      Rate and Rhythm: Normal rate and regular rhythm.      Heart sounds: Normal heart sounds. No murmur heard.     No gallop.   Pulmonary:      Effort: Pulmonary effort is normal. No respiratory distress.      Breath sounds: Normal breath sounds. No wheezing, rhonchi or rales.   Chest:      Chest wall: No tenderness.   Musculoskeletal:      Cervical back: Normal range of motion and neck supple.   Neurological:      General: No focal deficit present.      Mental Status: She is alert and oriented to person, place, and time. Mental status is at baseline.   Psychiatric:         Mood and Affect: Mood normal.       Assessment & Plan   Diagnoses and all orders for this visit:    1. Acquired hypothyroidism (Primary)  -     Comprehensive Metabolic Panel  -     CBC Auto Differential  -     Lipid Panel  -     T4, Free  -     TSH  -     Synthroid 75 MCG tablet; Take 1 tablet by mouth Daily.  Dispense: 90 tablet; Refill: 1    2. Mixed hyperlipidemia    3. Vitamin B12 deficiency  -     Vitamin B12  -     Discontinue: cyanocobalamin injection 1,000 mcg  -     cyanocobalamin injection 1,000 mcg    4. Hormone replacement therapy (HRT)    5. Systemic lupus erythematosus, unspecified SLE type, unspecified organ involvement status  -     meloxicam (MOBIC) 7.5 MG tablet; Take 1 tablet by mouth Daily.  Dispense: 90 tablet; Refill: 1    6. Screen for colon cancer      I reviewed her medical problems and her medications.   I also reviewed her previous labs and the new set of fasting blood work is being done today.  Patient would like to be switched from generic levothyroxine to brand name Synthroid and prescription was given.  Patient will continue her vitamin B12 shots monthly as she has been for several years.  I refilled her meloxicam.  I also briefly reviewed her health maintenance and I will be getting a copy of her most recent colonoscopy.        Return in about 6 months (around 9/11/2024) for Medicare Wellness.    Requested Prescriptions     Signed Prescriptions Disp Refills    meloxicam (MOBIC) 7.5 MG tablet 90 tablet 1     Sig: Take 1 tablet by mouth Daily.    Synthroid 75 MCG tablet 90 tablet 1     Sig: Take 1 tablet by mouth Daily.

## 2024-04-09 ENCOUNTER — OFFICE VISIT (OUTPATIENT)
Dept: FAMILY MEDICINE CLINIC | Facility: CLINIC | Age: 69
End: 2024-04-09
Payer: MEDICARE

## 2024-04-09 VITALS
HEIGHT: 64 IN | BODY MASS INDEX: 26.5 KG/M2 | RESPIRATION RATE: 16 BRPM | TEMPERATURE: 97.5 F | DIASTOLIC BLOOD PRESSURE: 73 MMHG | OXYGEN SATURATION: 96 % | HEART RATE: 92 BPM | SYSTOLIC BLOOD PRESSURE: 141 MMHG | WEIGHT: 155.2 LBS

## 2024-04-09 DIAGNOSIS — J06.9 ACUTE URI: Primary | ICD-10-CM

## 2024-04-09 DIAGNOSIS — R05.1 ACUTE COUGH: ICD-10-CM

## 2024-04-09 LAB
EXPIRATION DATE: NORMAL
FLUAV AG UPPER RESP QL IA.RAPID: NOT DETECTED
FLUBV AG UPPER RESP QL IA.RAPID: NOT DETECTED
INTERNAL CONTROL: NORMAL
Lab: NORMAL
SARS-COV-2 AG UPPER RESP QL IA.RAPID: NOT DETECTED

## 2024-04-09 PROCEDURE — 87428 SARSCOV & INF VIR A&B AG IA: CPT | Performed by: FAMILY MEDICINE

## 2024-04-09 PROCEDURE — 99213 OFFICE O/P EST LOW 20 MIN: CPT | Performed by: FAMILY MEDICINE

## 2024-04-09 RX ORDER — BENZONATATE 200 MG/1
200 CAPSULE ORAL 3 TIMES DAILY PRN
Qty: 30 CAPSULE | Refills: 0 | Status: SHIPPED | OUTPATIENT
Start: 2024-04-09 | End: 2024-04-19

## 2024-04-09 RX ORDER — GUAIFENESIN 600 MG/1
1200 TABLET, EXTENDED RELEASE ORAL 2 TIMES DAILY
COMMUNITY

## 2024-04-09 RX ORDER — DEXTROMETHORPHAN HYDROBROMIDE AND PROMETHAZINE HYDROCHLORIDE 15; 6.25 MG/5ML; MG/5ML
SYRUP ORAL
COMMUNITY
Start: 2024-04-08

## 2024-04-09 RX ORDER — METHYLPREDNISOLONE 4 MG/1
TABLET ORAL
COMMUNITY
Start: 2024-04-08

## 2024-04-09 NOTE — PROGRESS NOTES
Subjective   Chief Complaint   Patient presents with    Cough    URI    Nasal Congestion     Fever over weekend     Courtney Reis is a 68 y.o. female.     Patient Care Team:  Effie Quinteros MD as PCP - General (Family Medicine)  Eugenie Sanchez MD as Consulting Physician (Rheumatology)  Carine Brown MD as Consulting Physician (Endocrinology)    History of Present Illness  She is coming in today due to upper respiratory symptoms which she started experiencing 1 week ago.  She reports having cough and congestion and at the beginning she also running fever up to 101.7.  She has not had fever at all over the last 3 days or so.  She feels congestion in her chest and also in her sinuses and drainage in the back of her throat.  She had leftover Z-Sanket at home and she started taking it and finished that 2 days ago.  She overall feels better now.  [facility] and was given prescription for Medrol Dosepak and Phenergan DM.  She has not started taking the steroid as she really did not know whether she really needed it.  She took the cough medicine, but she feels like this medication made her somehow nauseous.       The following portions of the patient's history were reviewed and updated as appropriate: allergies, current medications, past family history, past medical history, past social history, past surgical history, and problem list.  Past Medical History:   Diagnosis Date    Anemia     Disease of thyroid gland     Fibromyalgia     Hypothyroidism     Lupus     Pernicious anemia      Past Surgical History:   Procedure Laterality Date    ANKLE SURGERY      right ankle    HYSTERECTOMY      LUMBAR SPINE SURGERY  2019     The patient has a family history of  Family History   Problem Relation Age of Onset    Breast cancer Mother     Breast cancer Cousin     Colon cancer Brother      Social History     Socioeconomic History    Marital status:    Tobacco Use    Smoking status: Never  "    Passive exposure: Never    Smokeless tobacco: Never   Vaping Use    Vaping status: Never Used   Substance and Sexual Activity    Alcohol use: Yes     Alcohol/week: 1.0 standard drink of alcohol     Types: 1 Glasses of wine per week     Comment: Social    Drug use: Never    Sexual activity: Defer       Review of Systems   Constitutional:  Negative for activity change, appetite change, chills and fever.   HENT:  Positive for congestion. Negative for ear pain, postnasal drip, sinus pressure, sore throat and swollen glands.    Respiratory:  Positive for cough. Negative for choking, chest tightness, shortness of breath, wheezing and stridor.    Cardiovascular:  Negative for chest pain.   Skin:  Negative for dry skin and rash.     Visit Vitals  /73 (BP Location: Left arm, Patient Position: Sitting, Cuff Size: Adult)   Pulse 92   Temp 97.5 °F (36.4 °C) (Infrared)   Resp 16   Ht 162.6 cm (64.02\")   Wt 70.4 kg (155 lb 3.2 oz)   SpO2 96%   BMI 26.63 kg/m²              Current Outpatient Medications:     cyclobenzaprine (FLEXERIL) 10 MG tablet, Take 1 tablet by mouth 3 (Three) Times a Day As Needed for Muscle Spasms., Disp: , Rfl:     estradiol (CLIMARA) 0.05 MG/24HR patch, Place 1 patch on the skin as directed by provider 1 (One) Time Per Week., Disp: , Rfl:     guaiFENesin (MUCINEX) 600 MG 12 hr tablet, Take 2 tablets by mouth 2 (Two) Times a Day., Disp: , Rfl:     HYDROcodone-acetaminophen (NORCO) 5-325 MG per tablet, Take 1 tablet by mouth Daily As Needed., Disp: , Rfl:     hydroxychloroquine (PLAQUENIL) 200 MG tablet, Take 1 tablet by mouth Daily. One and a half, Disp: , Rfl:     meloxicam (MOBIC) 7.5 MG tablet, Take 1 tablet by mouth Daily., Disp: 90 tablet, Rfl: 1    methylPREDNISolone (MEDROL) 4 MG dose pack, , Disp: , Rfl:     promethazine-dextromethorphan (PROMETHAZINE-DM) 6.25-15 MG/5ML syrup, , Disp: , Rfl:     Synthroid 75 MCG tablet, Take 1 tablet by mouth Daily., Disp: 90 tablet, Rfl: 1    benzonatate " (TESSALON) 200 MG capsule, Take 1 capsule by mouth 3 (Three) Times a Day As Needed for Cough for up to 10 days., Disp: 30 capsule, Rfl: 0    Current Facility-Administered Medications:     cyanocobalamin injection 1,000 mcg, 1,000 mcg, Intramuscular, Q28 Days, Effie Quinteros MD, 1,000 mcg at 03/11/24 0930    Objective   Physical Exam  Vitals and nursing note reviewed.   Constitutional:       General: She is not in acute distress.     Appearance: She is well-developed.   HENT:      Head: Normocephalic and atraumatic.      Right Ear: Tympanic membrane and external ear normal.      Left Ear: Tympanic membrane and external ear normal.      Nose: Congestion present.      Mouth/Throat:      Pharynx: No oropharyngeal exudate or posterior oropharyngeal erythema.   Eyes:      Conjunctiva/sclera: Conjunctivae normal.      Pupils: Pupils are equal, round, and reactive to light.   Cardiovascular:      Rate and Rhythm: Normal rate and regular rhythm.      Heart sounds: Normal heart sounds.   Pulmonary:      Effort: Pulmonary effort is normal. No respiratory distress.      Breath sounds: Normal breath sounds. No wheezing or rales.   Musculoskeletal:      Cervical back: Normal range of motion and neck supple.   Skin:     General: Skin is warm and dry.      Findings: No rash.         Assessment & Plan   Diagnoses and all orders for this visit:    1. Acute URI (Primary)  -     POCT SARS-CoV-2 + Flu Antigen ROWDY    2. Acute cough  -     benzonatate (TESSALON) 200 MG capsule; Take 1 capsule by mouth 3 (Three) Times a Day As Needed for Cough for up to 10 days.  Dispense: 30 capsule; Refill: 0  -     XR Chest 2 View      Her rapid COVID-19 test and flu test were negative today.  Patient still has some lingering cough and I gave her prescription for some Tessalon Perles.  She overall reports feeling however better.  I also gave her order for the chest x-ray and she was advised to get this done if her symptoms do not improve in near  future.      Return if symptoms worsen or fail to improve, for Recheck.    Requested Prescriptions     Signed Prescriptions Disp Refills    benzonatate (TESSALON) 200 MG capsule 30 capsule 0     Sig: Take 1 capsule by mouth 3 (Three) Times a Day As Needed for Cough for up to 10 days.       Effie Quinteros MD  04/09/2024

## 2024-04-15 ENCOUNTER — CLINICAL SUPPORT (OUTPATIENT)
Dept: FAMILY MEDICINE CLINIC | Facility: CLINIC | Age: 69
End: 2024-04-15
Payer: MEDICARE

## 2024-04-15 DIAGNOSIS — E53.8 B12 DEFICIENCY: Primary | ICD-10-CM

## 2024-04-15 PROCEDURE — 96372 THER/PROPH/DIAG INJ SC/IM: CPT | Performed by: FAMILY MEDICINE

## 2024-04-15 RX ORDER — LANOLIN ALCOHOL/MO/W.PET/CERES
1000 CREAM (GRAM) TOPICAL DAILY
Qty: 1 TABLET | Refills: 0 | Status: SHIPPED | OUTPATIENT
Start: 2024-04-15

## 2024-04-15 RX ADMIN — CYANOCOBALAMIN 1000 MCG: 1000 INJECTION, SOLUTION INTRAMUSCULAR; SUBCUTANEOUS at 09:46

## 2024-04-15 NOTE — PROGRESS NOTES
Injection  Injection performed in left deltoid by Dian Green MA. Patient tolerated the procedure well without complications.  04/15/24   Dian Green MA

## 2024-05-21 ENCOUNTER — CLINICAL SUPPORT (OUTPATIENT)
Dept: FAMILY MEDICINE CLINIC | Facility: CLINIC | Age: 69
End: 2024-05-21
Payer: MEDICARE

## 2024-05-21 DIAGNOSIS — E53.8 B12 DEFICIENCY: Primary | ICD-10-CM

## 2024-05-21 PROCEDURE — 96372 THER/PROPH/DIAG INJ SC/IM: CPT | Performed by: FAMILY MEDICINE

## 2024-05-21 RX ORDER — CYANOCOBALAMIN 1000 UG/ML
1000 INJECTION, SOLUTION INTRAMUSCULAR; SUBCUTANEOUS
Status: SHIPPED | OUTPATIENT
Start: 2024-05-21

## 2024-05-21 RX ADMIN — CYANOCOBALAMIN 1000 MCG: 1000 INJECTION, SOLUTION INTRAMUSCULAR; SUBCUTANEOUS at 09:29

## 2024-05-29 ENCOUNTER — OFFICE VISIT (OUTPATIENT)
Dept: FAMILY MEDICINE CLINIC | Facility: CLINIC | Age: 69
End: 2024-05-29
Payer: MEDICARE

## 2024-05-29 VITALS
HEIGHT: 64 IN | HEART RATE: 93 BPM | WEIGHT: 154.6 LBS | DIASTOLIC BLOOD PRESSURE: 78 MMHG | RESPIRATION RATE: 18 BRPM | OXYGEN SATURATION: 98 % | BODY MASS INDEX: 26.4 KG/M2 | SYSTOLIC BLOOD PRESSURE: 153 MMHG | TEMPERATURE: 97.3 F

## 2024-05-29 DIAGNOSIS — G44.52 NEW DAILY PERSISTENT HEADACHE: Primary | ICD-10-CM

## 2024-05-29 DIAGNOSIS — I10 HYPERTENSION, UNSPECIFIED TYPE: ICD-10-CM

## 2024-05-29 PROCEDURE — 1159F MED LIST DOCD IN RCRD: CPT | Performed by: NURSE PRACTITIONER

## 2024-05-29 PROCEDURE — 99213 OFFICE O/P EST LOW 20 MIN: CPT | Performed by: NURSE PRACTITIONER

## 2024-05-29 PROCEDURE — 1160F RVW MEDS BY RX/DR IN RCRD: CPT | Performed by: NURSE PRACTITIONER

## 2024-05-29 RX ORDER — AMLODIPINE BESYLATE 5 MG/1
5 TABLET ORAL DAILY
Qty: 90 TABLET | Refills: 0 | Status: SHIPPED | OUTPATIENT
Start: 2024-05-29

## 2024-05-29 NOTE — PROGRESS NOTES
"Chief Complaint  Headache (Started Saturday. Had strange tightness in left side above eye. Since last night she has had issues with movement that is causing pain. )    Subjective        Courtney Reis presents to [facility]  History of Present Illness    Headache:   Onset of headache began 5 days ago. Pain scale 6/10. Severity of symptoms moderate. Status is intermittent. Frequency is daily; persistent. Location is left frontal (different from any headache in past). Timing occurs daytime, nocturnal (last night with nighttime awakening).  Context includes stress, hx lupus, no known history of migraines.  Received shingles vaccine x 2. Seen by rheumatology provider at Dr. Caldwell's office this morning; labs today completed.   Blood pressure readings at home 130/80's. Eye exam less than 1 year ago.  Aggravated by movement. Relieved by nothing.  Pertinent negatives include blurred vision, diplopia, dizziness, fever, hemianopsia left, hemianopsia right, loss of consciousness, memory loss, nausea, nasal discharge, neck stiffness, personality changes, phonophobia, photophobia, vision loss right/left, visual aura, vertigo, and vomiting.        Objective   Vital Signs:  /78 (BP Location: Left arm, Patient Position: Sitting, Cuff Size: Adult)   Pulse 93   Temp 97.3 °F (36.3 °C)   Resp 18   Ht 162.6 cm (64\")   Wt 70.1 kg (154 lb 9.6 oz)   SpO2 98%   BMI 26.54 kg/m²   Estimated body mass index is 26.54 kg/m² as calculated from the following:    Height as of this encounter: 162.6 cm (64\").    Weight as of this encounter: 70.1 kg (154 lb 9.6 oz).               Physical Exam  Constitutional:       General: She is not in acute distress.     Comments: Pleasant, converses appropriately    HENT:      Head: Normocephalic and atraumatic.      Right Ear: Tympanic membrane, ear canal and external ear normal.      Left Ear: Tympanic membrane, ear canal and external ear normal.      Nose: Nose " normal.      Right Sinus: No frontal sinus tenderness.      Left Sinus: No frontal sinus tenderness.      Mouth/Throat:      Lips: Pink.      Mouth: Mucous membranes are moist.   Eyes:      Conjunctiva/sclera: Conjunctivae normal.   Cardiovascular:      Rate and Rhythm: Normal rate and regular rhythm.      Pulses: Normal pulses.      Heart sounds: S1 normal and S2 normal.   Pulmonary:      Effort: Pulmonary effort is normal.      Breath sounds: Normal breath sounds.   Musculoskeletal:         General: Normal range of motion.      Cervical back: Neck supple.      Right lower leg: No edema.      Left lower leg: No edema.   Skin:     General: Skin is warm and dry.   Neurological:      General: No focal deficit present.      Mental Status: She is alert and oriented to person, place, and time.      Cranial Nerves: Cranial nerves 2-12 are intact.      Motor: Motor function is intact.      Gait: Gait is intact.   Psychiatric:         Mood and Affect: Mood and affect normal.         Thought Content: Thought content normal.         Judgment: Judgment normal.        Result Review :      CMP          3/11/2024    09:38   CMP   Glucose 95    BUN 26    Creatinine 0.85    EGFR 75.2    Sodium 140    Potassium 4.4    Chloride 104    Calcium 9.7    Total Protein 7.1    Albumin 4.8    Globulin 2.3    Total Bilirubin 0.3    Alkaline Phosphatase 102    AST (SGOT) 23    ALT (SGPT) 21    Albumin/Globulin Ratio 2.1    BUN/Creatinine Ratio 30.6    Anion Gap 8.0      CBC          11/8/2023    09:41 3/11/2024    09:38   CBC   WBC 5.09     4.87    RBC 5.26     4.88    Hemoglobin 15.0     14.0    Hematocrit 45.8     42.5    MCV 87.1     87.1    MCH 28.5     28.7    MCHC 32.8     32.9    RDW 13.0     13.0    Platelets 275     247       Details          This result is from an external source.             CBC w/diff          11/8/2023    09:41 3/11/2024    09:38   CBC w/Diff   WBC 5.09     4.87    RBC 5.26     4.88    Hemoglobin 15.0     14.0     Hematocrit 45.8     42.5    MCV 87.1     87.1    MCH 28.5     28.7    MCHC 32.8     32.9    RDW 13.0     13.0    Platelets 275     247    Neutrophil Rel % 58.1     52.3    Immature Granulocyte Rel % 0.2     0.2    Lymphocyte Rel % 31.4     33.7    Monocyte Rel % 8.3     10.9    Eosinophil Rel % 1.4     2.1    Basophil Rel % 0.6     0.8       Details          This result is from an external source.                          Assessment and Plan     Diagnoses and all orders for this visit:    1. New daily persistent headache (Primary)  -     amLODIPine (NORVASC) 5 MG tablet; Take 1 tablet by mouth Daily.  Dispense: 90 tablet; Refill: 0  -     CT Head Without Contrast; Future    2. Hypertension, unspecified type  -     amLODIPine (NORVASC) 5 MG tablet; Take 1 tablet by mouth Daily.  Dispense: 90 tablet; Refill: 0    Discussed CT versus MRI.  Prefers no MRI for right now.  Discussed red flags such as visual disturbance, dizziness, numbness, weakness, difficulty speaking--to ER if occurs.         Follow Up     Return in about 4 weeks (around 6/26/2024) for follow up headache and hypertension with Dr. Quinteros .  Patient was given instructions and counseling regarding her condition or for health maintenance advice. Please see specific information pulled into the AVS if appropriate.

## 2024-06-10 ENCOUNTER — HOSPITAL ENCOUNTER (OUTPATIENT)
Dept: CT IMAGING | Facility: HOSPITAL | Age: 69
Discharge: HOME | End: 2024-06-10
Admitting: NURSE PRACTITIONER
Payer: MEDICARE

## 2024-06-10 DIAGNOSIS — R93.0 ABNORMAL CT OF THE HEAD: Primary | ICD-10-CM

## 2024-06-10 DIAGNOSIS — G44.52 NEW DAILY PERSISTENT HEADACHE: ICD-10-CM

## 2024-06-10 PROCEDURE — 70450 CT HEAD/BRAIN W/O DYE: CPT

## 2024-06-17 ENCOUNTER — OFFICE VISIT (OUTPATIENT)
Dept: FAMILY MEDICINE CLINIC | Facility: CLINIC | Age: 69
End: 2024-06-17
Payer: MEDICARE

## 2024-06-17 ENCOUNTER — TELEPHONE (OUTPATIENT)
Dept: FAMILY MEDICINE CLINIC | Facility: CLINIC | Age: 69
End: 2024-06-17

## 2024-06-17 VITALS
HEIGHT: 64 IN | TEMPERATURE: 97.1 F | BODY MASS INDEX: 26.6 KG/M2 | SYSTOLIC BLOOD PRESSURE: 136 MMHG | WEIGHT: 155.8 LBS | OXYGEN SATURATION: 97 % | RESPIRATION RATE: 16 BRPM | DIASTOLIC BLOOD PRESSURE: 79 MMHG | HEART RATE: 90 BPM

## 2024-06-17 DIAGNOSIS — D32.9 MENINGIOMA: ICD-10-CM

## 2024-06-17 DIAGNOSIS — G44.52 NEW DAILY PERSISTENT HEADACHE: Primary | ICD-10-CM

## 2024-06-17 DIAGNOSIS — R03.0 ELEVATED BLOOD PRESSURE READING: ICD-10-CM

## 2024-06-17 PROBLEM — R05.1 ACUTE COUGH: Status: RESOLVED | Noted: 2024-04-09 | Resolved: 2024-06-17

## 2024-06-17 PROBLEM — J06.9 ACUTE URI: Status: RESOLVED | Noted: 2024-04-09 | Resolved: 2024-06-17

## 2024-06-17 PROCEDURE — G2211 COMPLEX E/M VISIT ADD ON: HCPCS | Performed by: FAMILY MEDICINE

## 2024-06-17 PROCEDURE — 99213 OFFICE O/P EST LOW 20 MIN: CPT | Performed by: FAMILY MEDICINE

## 2024-06-17 RX ORDER — ALPRAZOLAM 0.5 MG/1
0.5 TABLET ORAL 3 TIMES DAILY PRN
COMMUNITY
Start: 2024-06-11

## 2024-06-17 RX ORDER — METHYLPREDNISOLONE 4 MG/1
TABLET ORAL
Qty: 1 EACH | Refills: 0 | Status: SHIPPED | OUTPATIENT
Start: 2024-06-17

## 2024-06-17 NOTE — TELEPHONE ENCOUNTER
Patient has a Neurosurgery referral in the chart. Did you mean to choose this or is it mean for Neurology

## 2024-06-17 NOTE — TELEPHONE ENCOUNTER
Caller: Courtney Reis    Relationship to patient: Self    Best call back number: [phone]    Patient is needing:     ASKING FOR THE REFERRAL THAT DR. GUTIERREZ PUT IN FOR HER TO GO TO DR. SEIPEL IN [place].

## 2024-06-17 NOTE — TELEPHONE ENCOUNTER
Patient needs to see a neurosurgeon for the consultation and opinion due to meningioma.  Dr. Seiple is a neurologist.

## 2024-06-17 NOTE — PROGRESS NOTES
Subjective   Chief Complaint   Patient presents with    Discuss CT Results    Headache     Courtney Reis is a 68 y.o. female.     Patient Care Team:  Effie Quinteros MD as PCP - General (Family Medicine)  Eugenie Sanchez MD as Consulting Physician (Rheumatology)  Carine Brown MD as Consulting Physician (Endocrinology)    History of Present Illness  She is coming in today to follow-up on her headaches and also her recent CT of the head.  Patient was seen in the office couple weeks ago by nurse practitioner due to a new onset of the headache about 4 days prior to that appointment.  Patient reports that the headache was fairly intense located mainly in the left forehead area and was getting worse with changing position of the head.  Patient at that time had a CT of the head ordered which showed a 6 mm meningioma.  Her blood pressure was also elevated and she was started on amlodipine.  Patient tells me that she stayed on amlodipine only for few days and then discontinue the medication.  She has been monitoring her blood pressure at home closely and provided a blood pressure log for review today.  Her blood pressure readings have been staying very good.  Due to her recent findings on the CT of the head MRI of the head with and without contrast was ordered, but patient reports having anxiety about the test due to her claustrophobia.       The following portions of the patient's history were reviewed and updated as appropriate: allergies, current medications, past family history, past medical history, past social history, past surgical history, and problem list.  Past Medical History:   Diagnosis Date    Anemia     Disease of thyroid gland     Fibromyalgia     Hypothyroidism     Lupus     Pernicious anemia      Past Surgical History:   Procedure Laterality Date    ANKLE SURGERY      right ankle    HYSTERECTOMY      LUMBAR SPINE SURGERY  2019     The patient has a family history of  Family History   Problem  "Relation Age of Onset    Breast cancer Mother     Breast cancer Cousin     Colon cancer Brother      Social History     Socioeconomic History    Marital status:    Tobacco Use    Smoking status: Never     Passive exposure: Never    Smokeless tobacco: Never   Vaping Use    Vaping status: Never Used   Substance and Sexual Activity    Alcohol use: Yes     Alcohol/week: 1.0 standard drink of alcohol     Types: 1 Glasses of wine per week     Comment: Social    Drug use: Never    Sexual activity: Defer       Review of Systems   Constitutional:  Negative for activity change, fatigue and fever.   Respiratory:  Negative for shortness of breath and wheezing.    Cardiovascular:  Negative for chest pain, palpitations and leg swelling.   Musculoskeletal:  Negative for arthralgias and back pain.   Skin:  Negative for rash.   Neurological:  Positive for headache. Negative for tremors.     Visit Vitals  /79 (BP Location: Right arm, Patient Position: Sitting, Cuff Size: Adult)   Pulse 90   Temp 97.1 °F (36.2 °C) (Infrared)   Resp 16   Ht 162.6 cm (64\")   Wt 70.7 kg (155 lb 12.8 oz)   SpO2 97%   BMI 26.74 kg/m²          Current Outpatient Medications:     ALPRAZolam (XANAX) 0.5 MG tablet, Take 1 tablet by mouth 3 (Three) Times a Day As Needed. for anxiety, Disp: , Rfl:     cyclobenzaprine (FLEXERIL) 10 MG tablet, Take 1 tablet by mouth 3 (Three) Times a Day As Needed for Muscle Spasms., Disp: , Rfl:     estradiol (CLIMARA) 0.05 MG/24HR patch, Place 1 patch on the skin as directed by provider 1 (One) Time Per Week., Disp: , Rfl:     guaiFENesin (MUCINEX) 600 MG 12 hr tablet, Take 2 tablets by mouth 2 (Two) Times a Day., Disp: , Rfl:     hydroxychloroquine (PLAQUENIL) 200 MG tablet, Take 1 tablet by mouth Daily. One and a half, Disp: , Rfl:     meloxicam (MOBIC) 7.5 MG tablet, Take 1 tablet by mouth Daily., Disp: 90 tablet, Rfl: 1    Synthroid 75 MCG tablet, Take 1 tablet by mouth Daily., Disp: 90 tablet, Rfl: 1    " HYDROcodone-acetaminophen (NORCO) 5-325 MG per tablet, Take 1 tablet by mouth Daily As Needed. (Patient not taking: Reported on 6/17/2024), Disp: , Rfl:     methylPREDNISolone (Medrol) 4 MG dose pack, Take as directed on package instructions., Disp: 1 each, Rfl: 0    Current Facility-Administered Medications:     cyanocobalamin injection 1,000 mcg, 1,000 mcg, Intramuscular, Q28 Days, Effie Quinteros MD, 1,000 mcg at 05/21/24 0929    Objective   Physical Exam  Constitutional:       General: She is not in acute distress.     Appearance: Normal appearance. She is well-developed. She is not ill-appearing or diaphoretic.      Comments: Patient is in no distress, patient has normal voice and speech.  Normal respiratory effort.   HENT:      Head: Normocephalic and atraumatic.   Pulmonary:      Effort: Pulmonary effort is normal.   Musculoskeletal:      Cervical back: Normal range of motion and neck supple.   Neurological:      General: No focal deficit present.      Mental Status: She is alert and oriented to person, place, and time. Mental status is at baseline.      Cranial Nerves: No cranial nerve deficit.      Sensory: No sensory deficit.      Motor: No weakness.      Coordination: Coordination normal.   Psychiatric:         Mood and Affect: Mood normal.         CT Head Without Contrast    Result Date: 6/10/2024  Impression: Impression: 1. 6 mm rounded hyperdense focus in the right parafalcine region at the high convexity level may be due to a partially calcified meningioma. This could be more definitively characterized by MRI with and without contrast. 2. No acute intracranial abnormality identified by noncontrast CT. Electronically Signed: Shimon Curtis MD  6/10/2024 5:35 PM EDT  Workstation ID: [id number]       Assessment & Plan   Diagnoses and all orders for this visit:    1. New daily persistent headache (Primary)  -     Cancel: Ambulatory Referral to Neurosurgery  -     methylPREDNISolone (Medrol) 4 MG dose pack;  Take as directed on package instructions.  Dispense: 1 each; Refill: 0    2. Meningioma  -     Ambulatory Referral to Neurosurgery    3. Elevated blood pressure reading      I reviewed her recent CT of the head which shows a small meningioma of the size of 6 mm.  Patient's headache has improved and I discussed with the patient that her meningioma finding is most probably accidental and has no bearing on her headaches.  I will try the course of the steroid to see if this will break the headache cycle.  I will also refer the patient to talk to the neurosurgeon for consultation.  Patient has a lot of anxiety about getting MRI due to her claustrophobia and she tells me that several years ago she was not able to get through a different type of MRI even with the anxiolytic medication.  I also reviewed her home blood pressure reading log and these readings look good, her blood pressure today also looks good.  Patient will continue to periodically monitor blood pressure at home.  She will follow-up with me down the road for the further management of her chronic medical issues as scheduled.        Return if symptoms worsen or fail to improve, for Recheck.    Requested Prescriptions     Signed Prescriptions Disp Refills    methylPREDNISolone (Medrol) 4 MG dose pack 1 each 0     Sig: Take as directed on package instructions.       Effie Quinteros MD  06/17/2024

## 2024-06-18 NOTE — TELEPHONE ENCOUNTER
CALLED PATIENT AND ADVISED DIFFERENCE BETWEEN NEUROSURG AND NEUROLOGY. ADVISED THAT HER NEUROSURGERY REFERRAL CAN'T GO TO DR SEIPEL DUE TO HIM BEING A NEUROLOGIST, AND THE REFERRAL IS NOT FOR THAT. PATIENT EXPRESSED UNDERSTANDING.

## 2024-07-01 ENCOUNTER — TELEPHONE (OUTPATIENT)
Dept: NEUROSURGERY | Facility: CLINIC | Age: 69
End: 2024-07-01
Payer: MEDICARE

## 2024-07-01 NOTE — TELEPHONE ENCOUNTER
I called and left  for Courtney to ask her if she could move her appmt up to 8 am with Dr. Matos on Friday July 5th due to him having surgery scheduled in [place] on the same day.

## 2024-07-03 NOTE — PROGRESS NOTES
Patient ID: Courtney Reis is a 68 y.o. female is being seen for consultation today at the request of Effie Quinteros MD for meningioma.    Imaging: CT of the head performed on 06/10/2024    Subjective     The patient is here in regards to   Chief Complaint   Patient presents with    Brain Tumor       History of Present Illness  Courtney is following up in clinic after a incidentally found meningioma on head CT.  She had a new onset persistent headache which she described as a tightening sensation over her left frontal area.  A steroid pack has helped reduce the severity of this headache and she is now overall feeling better although the headache is still present sometimes.      While in the room and during my examination of the patient I wore a mask and eye protection.  I washed my hands before and after this patient encounter.  The patient was also wearing a mask.    The following portions of the patient's history were reviewed and updated as appropriate: allergies, current medications, past family history, past medical history, past social history, past surgical history and problem list.    Review of Systems   Constitutional:  Negative for fever.   HENT:  Negative for congestion and tinnitus.    Eyes:  Negative for visual disturbance.   Respiratory:  Negative for chest tightness and shortness of breath.    Cardiovascular:  Negative for chest pain.   Gastrointestinal:  Negative for diarrhea, nausea and vomiting.   Endocrine: Negative for cold intolerance and heat intolerance.   Genitourinary:  Negative for difficulty urinating.   Musculoskeletal:  Negative for neck pain and neck stiffness.   Skin:  Positive for rash (eczema).   Allergic/Immunologic: Negative for food allergies.   Neurological:  Positive for headaches. Negative for dizziness, tremors, seizures, speech difficulty, weakness and light-headedness.   Hematological:  Bruises/bleeds easily.   Psychiatric/Behavioral:  Negative for confusion and decreased  concentration.         Past Medical History:   Diagnosis Date    Anemia     Disease of thyroid gland     Fibromyalgia     Hypothyroidism     Lupus     Pernicious anemia        No Known Allergies      Family History   Problem Relation Age of Onset    Breast cancer Mother     Breast cancer Cousin     Colon cancer Brother        Social History     Socioeconomic History    Marital status:    Tobacco Use    Smoking status: Never     Passive exposure: Never    Smokeless tobacco: Never   Vaping Use    Vaping status: Never Used   Substance and Sexual Activity    Alcohol use: Yes     Alcohol/week: 1.0 standard drink of alcohol     Types: 1 Glasses of wine per week     Comment: Social    Drug use: Never    Sexual activity: Defer       Past Surgical History:   Procedure Laterality Date    ANKLE SURGERY      right ankle    HYSTERECTOMY      LUMBAR SPINE SURGERY  2019         Objective     Vitals:    07/05/24 0800   BP: 142/84   Pulse: 95   Resp: 16   Temp: 97.4 °F (36.3 °C)   SpO2: 96%     Body mass index is 26.78 kg/m².    Physical Exam  Constitutional:       Appearance: Normal appearance.   HENT:      Head: Normocephalic and atraumatic.   Eyes:      Extraocular Movements: Extraocular movements intact.      Conjunctiva/sclera: Conjunctivae normal.      Pupils: Pupils are equal, round, and reactive to light.   Cardiovascular:      Rate and Rhythm: Normal rate and regular rhythm.      Pulses: Normal pulses.   Pulmonary:      Breath sounds: Normal breath sounds.   Abdominal:      Palpations: Abdomen is soft.   Musculoskeletal:         General: Normal range of motion.      Cervical back: Normal range of motion and neck supple.   Skin:     General: Skin is warm and dry.   Neurological:      Mental Status: She is alert and oriented to person, place, and time.      Cranial Nerves: Cranial nerves 2-12 are intact.      Motor: Motor function is intact. No weakness or atrophy.      Coordination: Coordination is intact. Romberg  sign negative. Romberg Test normal.      Gait: Gait is intact. Gait normal.      Deep Tendon Reflexes: Reflexes are normal and symmetric.      Reflex Scores:       Tricep reflexes are 2+ on the right side and 2+ on the left side.       Bicep reflexes are 2+ on the right side and 2+ on the left side.       Brachioradialis reflexes are 2+ on the right side and 2+ on the left side.       Patellar reflexes are 2+ on the right side and 2+ on the left side.       Achilles reflexes are 2+ on the right side and 2+ on the left side.  Psychiatric:         Speech: Speech normal.         Neurologic Exam     Mental Status   Oriented to person, place, and time.   Attention: normal. Concentration: normal.   Speech: speech is normal   Level of consciousness: alert    Cranial Nerves   Cranial nerves II through XII intact.     CN III, IV, VI   Pupils are equal, round, and reactive to light.    Motor Exam   Muscle bulk: normal  Overall muscle tone: normal    Strength   Strength 5/5 except as noted.     Sensory Exam   Light touch normal.     Gait, Coordination, and Reflexes     Gait  Gait: normal    Coordination   Romberg: negative    Reflexes   Reflexes 2+ except as noted.   Right brachioradialis: 2+  Left brachioradialis: 2+  Right biceps: 2+  Left biceps: 2+  Right triceps: 2+  Left triceps: 2+  Right patellar: 2+  Left patellar: 2+  Right achilles: 2+  Left achilles: 2+      Assessment & Plan   Independent Review of Radiographic Studies:      I personally reviewed the images from the following studies.    CT: CT of the head was reviewed and shows small 6 mm mass in the right parafalcine area not causing any significant mass effect or shift.,  Partially calcified, circular and spherical in shape    Assessment/Plan: Has a very benign appearing extra-axial mass which likely represents a partially calcified meningioma.  These are very unlikely to require any sort of surgical intervention.  The patient's own preference would be to not  perform serial imaging if necessary because she does have some claustrophobia and would prefer not to have multiple MRI images.  I recommended at least getting 1 MRI to better characterize the tumor and then she can follow-up as needed pending the results    Medical Decision Making:      MRI brain with and without contrast         Diagnoses and all orders for this visit:    1. Meningioma (Primary)  -     MRI Brain With & Without Contrast; Future    2. Claustrophobia  -     diazePAM (Valium) 5 MG tablet; Take 1 tablet by mouth Once for 1 dose.  Dispense: 1 tablet; Refill: 0             Patient Instructions/Recommendations:    Follow-up as needed      Darnell Matos MD  07/05/24  08:18 EDT

## 2024-07-05 ENCOUNTER — OFFICE VISIT (OUTPATIENT)
Age: 69
End: 2024-07-05
Payer: MEDICARE

## 2024-07-05 VITALS
DIASTOLIC BLOOD PRESSURE: 84 MMHG | TEMPERATURE: 97.4 F | OXYGEN SATURATION: 96 % | HEART RATE: 95 BPM | RESPIRATION RATE: 16 BRPM | SYSTOLIC BLOOD PRESSURE: 142 MMHG | WEIGHT: 156 LBS | HEIGHT: 64 IN | BODY MASS INDEX: 26.63 KG/M2

## 2024-07-05 DIAGNOSIS — D32.9 MENINGIOMA: Primary | ICD-10-CM

## 2024-07-05 DIAGNOSIS — F40.240 CLAUSTROPHOBIA: ICD-10-CM

## 2024-07-05 RX ORDER — DIAZEPAM 5 MG/1
5 TABLET ORAL
Qty: 1 TABLET | Refills: 0 | Status: SHIPPED | OUTPATIENT
Start: 2024-07-05 | End: 2024-07-05

## 2024-07-12 ENCOUNTER — TELEPHONE (OUTPATIENT)
Dept: NEUROSURGERY | Facility: CLINIC | Age: 69
End: 2024-07-12
Payer: MEDICARE

## 2024-07-12 NOTE — TELEPHONE ENCOUNTER
Caller: Courtney Reis    Relationship: Self    Best call back number: [phone]      What specialty or service is being requested: MRI BRAIN    What is the provider, practice or medical service name: [facility]    What is the office location: [facility]    What is the office phone number: [phone]    Any additional details: PLEASE SEND ORDER TO [facility] AND PT WOULD LIKE A CALL BACK ONCE IT HAS BEEN SENT.     THANK YOU

## 2024-08-06 ENCOUNTER — CLINICAL SUPPORT (OUTPATIENT)
Dept: FAMILY MEDICINE CLINIC | Facility: CLINIC | Age: 69
End: 2024-08-06
Payer: MEDICARE

## 2024-08-06 DIAGNOSIS — D51.0 PERNICIOUS ANEMIA: Primary | ICD-10-CM

## 2024-08-06 PROCEDURE — 96372 THER/PROPH/DIAG INJ SC/IM: CPT | Performed by: FAMILY MEDICINE

## 2024-08-06 RX ORDER — CYANOCOBALAMIN 1000 UG/ML
1000 INJECTION, SOLUTION INTRAMUSCULAR; SUBCUTANEOUS
Status: SHIPPED | OUTPATIENT
Start: 2024-08-06

## 2024-08-06 RX ADMIN — CYANOCOBALAMIN 1000 MCG: 1000 INJECTION, SOLUTION INTRAMUSCULAR; SUBCUTANEOUS at 09:57

## 2024-08-06 NOTE — PROGRESS NOTES
Patient came in for Vitamin B 12 injection. Patient tolerated injection well in her Left Deltoid. Patient waited in Clinic for 15 minutes. ( I did verify B 12 injection before administering with Dr. Quinteros.. as patient has not been in for a B 12 injection for some time)

## 2024-08-28 DIAGNOSIS — I10 HYPERTENSION, UNSPECIFIED TYPE: ICD-10-CM

## 2024-08-28 DIAGNOSIS — G44.52 NEW DAILY PERSISTENT HEADACHE: ICD-10-CM

## 2024-08-28 RX ORDER — AMLODIPINE BESYLATE 5 MG/1
5 TABLET ORAL DAILY
Qty: 90 TABLET | Refills: 0 | OUTPATIENT
Start: 2024-08-28

## 2024-09-06 ENCOUNTER — OFFICE VISIT (OUTPATIENT)
Dept: FAMILY MEDICINE CLINIC | Facility: CLINIC | Age: 69
End: 2024-09-06
Payer: MEDICARE

## 2024-09-06 ENCOUNTER — LAB (OUTPATIENT)
Dept: FAMILY MEDICINE CLINIC | Facility: CLINIC | Age: 69
End: 2024-09-06
Payer: MEDICARE

## 2024-09-06 VITALS
WEIGHT: 156.9 LBS | OXYGEN SATURATION: 98 % | TEMPERATURE: 97.7 F | BODY MASS INDEX: 26.79 KG/M2 | SYSTOLIC BLOOD PRESSURE: 144 MMHG | HEART RATE: 94 BPM | HEIGHT: 64 IN | RESPIRATION RATE: 16 BRPM | DIASTOLIC BLOOD PRESSURE: 77 MMHG

## 2024-09-06 DIAGNOSIS — E55.9 VITAMIN D DEFICIENCY: ICD-10-CM

## 2024-09-06 DIAGNOSIS — E03.9 ACQUIRED HYPOTHYROIDISM: ICD-10-CM

## 2024-09-06 DIAGNOSIS — Z00.00 MEDICARE ANNUAL WELLNESS VISIT, SUBSEQUENT: Primary | ICD-10-CM

## 2024-09-06 DIAGNOSIS — Z12.31 VISIT FOR SCREENING MAMMOGRAM: ICD-10-CM

## 2024-09-06 LAB
25(OH)D3 SERPL-MCNC: 32.3 NG/ML (ref 30–100)
ALBUMIN SERPL-MCNC: 4.9 G/DL (ref 3.5–5.2)
ALBUMIN/GLOB SERPL: 2 G/DL
ALP SERPL-CCNC: 109 U/L (ref 39–117)
ALT SERPL W P-5'-P-CCNC: 35 U/L (ref 1–33)
ANION GAP SERPL CALCULATED.3IONS-SCNC: 12 MMOL/L (ref 5–15)
AST SERPL-CCNC: 27 U/L (ref 1–32)
BASOPHILS # BLD AUTO: 0.06 10*3/MM3 (ref 0–0.2)
BASOPHILS NFR BLD AUTO: 1.1 % (ref 0–1.5)
BILIRUB SERPL-MCNC: 0.4 MG/DL (ref 0–1.2)
BUN SERPL-MCNC: 20 MG/DL (ref 8–23)
BUN/CREAT SERPL: 22.5 (ref 7–25)
CALCIUM SPEC-SCNC: 9.8 MG/DL (ref 8.6–10.5)
CHLORIDE SERPL-SCNC: 103 MMOL/L (ref 98–107)
CHOLEST SERPL-MCNC: 216 MG/DL (ref 0–200)
CO2 SERPL-SCNC: 23 MMOL/L (ref 22–29)
CREAT SERPL-MCNC: 0.89 MG/DL (ref 0.57–1)
DEPRECATED RDW RBC AUTO: 42 FL (ref 37–54)
EGFRCR SERPLBLD CKD-EPI 2021: 70.7 ML/MIN/1.73
EOSINOPHIL # BLD AUTO: 0.16 10*3/MM3 (ref 0–0.4)
EOSINOPHIL NFR BLD AUTO: 2.9 % (ref 0.3–6.2)
ERYTHROCYTE [DISTWIDTH] IN BLOOD BY AUTOMATED COUNT: 13.2 % (ref 12.3–15.4)
GLOBULIN UR ELPH-MCNC: 2.4 GM/DL
GLUCOSE SERPL-MCNC: 114 MG/DL (ref 65–99)
HCT VFR BLD AUTO: 43.8 % (ref 34–46.6)
HDLC SERPL-MCNC: 76 MG/DL (ref 40–60)
HGB BLD-MCNC: 14.7 G/DL (ref 12–15.9)
IMM GRANULOCYTES # BLD AUTO: 0.01 10*3/MM3 (ref 0–0.05)
IMM GRANULOCYTES NFR BLD AUTO: 0.2 % (ref 0–0.5)
LDLC SERPL CALC-MCNC: 121 MG/DL (ref 0–100)
LDLC/HDLC SERPL: 1.55 {RATIO}
LYMPHOCYTES # BLD AUTO: 1.88 10*3/MM3 (ref 0.7–3.1)
LYMPHOCYTES NFR BLD AUTO: 34.5 % (ref 19.6–45.3)
MCH RBC QN AUTO: 29.2 PG (ref 26.6–33)
MCHC RBC AUTO-ENTMCNC: 33.6 G/DL (ref 31.5–35.7)
MCV RBC AUTO: 86.9 FL (ref 79–97)
MONOCYTES # BLD AUTO: 0.49 10*3/MM3 (ref 0.1–0.9)
MONOCYTES NFR BLD AUTO: 9 % (ref 5–12)
NEUTROPHILS NFR BLD AUTO: 2.85 10*3/MM3 (ref 1.7–7)
NEUTROPHILS NFR BLD AUTO: 52.3 % (ref 42.7–76)
NRBC BLD AUTO-RTO: 0 /100 WBC (ref 0–0.2)
PLATELET # BLD AUTO: 286 10*3/MM3 (ref 140–450)
PMV BLD AUTO: 11.2 FL (ref 6–12)
POTASSIUM SERPL-SCNC: 4.8 MMOL/L (ref 3.5–5.2)
PROT SERPL-MCNC: 7.3 G/DL (ref 6–8.5)
RBC # BLD AUTO: 5.04 10*6/MM3 (ref 3.77–5.28)
SODIUM SERPL-SCNC: 138 MMOL/L (ref 136–145)
TRIGL SERPL-MCNC: 112 MG/DL (ref 0–150)
TSH SERPL DL<=0.05 MIU/L-ACNC: 2.17 UIU/ML (ref 0.27–4.2)
VLDLC SERPL-MCNC: 19 MG/DL (ref 5–40)
WBC NRBC COR # BLD AUTO: 5.45 10*3/MM3 (ref 3.4–10.8)

## 2024-09-06 PROCEDURE — 80061 LIPID PANEL: CPT | Performed by: FAMILY MEDICINE

## 2024-09-06 PROCEDURE — 82306 VITAMIN D 25 HYDROXY: CPT | Performed by: FAMILY MEDICINE

## 2024-09-06 PROCEDURE — 83036 HEMOGLOBIN GLYCOSYLATED A1C: CPT | Performed by: FAMILY MEDICINE

## 2024-09-06 PROCEDURE — 85025 COMPLETE CBC W/AUTO DIFF WBC: CPT | Performed by: FAMILY MEDICINE

## 2024-09-06 PROCEDURE — 36415 COLL VENOUS BLD VENIPUNCTURE: CPT | Performed by: FAMILY MEDICINE

## 2024-09-06 PROCEDURE — 84443 ASSAY THYROID STIM HORMONE: CPT | Performed by: FAMILY MEDICINE

## 2024-09-06 PROCEDURE — 80053 COMPREHEN METABOLIC PANEL: CPT | Performed by: FAMILY MEDICINE

## 2024-09-06 RX ORDER — CYANOCOBALAMIN 1000 UG/ML
1000 INJECTION, SOLUTION INTRAMUSCULAR; SUBCUTANEOUS
Status: DISCONTINUED | OUTPATIENT
Start: 2024-09-06 | End: 2024-09-06

## 2024-09-06 RX ORDER — CYANOCOBALAMIN 1000 UG/ML
1000 INJECTION, SOLUTION INTRAMUSCULAR; SUBCUTANEOUS ONCE
Status: SHIPPED | OUTPATIENT
Start: 2024-09-06 | End: 2024-09-06

## 2024-09-06 RX ADMIN — CYANOCOBALAMIN 1000 MCG: 1000 INJECTION, SOLUTION INTRAMUSCULAR; SUBCUTANEOUS at 09:53

## 2024-09-06 NOTE — PROGRESS NOTES
Subjective   The ABCs of the Annual Wellness Visit  Medicare Wellness Visit    Chief Complaint   Patient presents with    Medicare Wellness-subsequent       Courtney Reis is a 69 y.o. patient who presents for a Medicare Wellness Visit.  She lives with her , she is fully independent with her activities of daily living.  She stays physically active and, enjoys working in her garden.  No issues with depression or memory problems are being reported.  No frequent falls.  She has been on hormone replacement therapy for quite some time which was initiated by her gynecologist. Patient does not report any chest pain, shortness of breath, dizziness, nausea, vomiting, or diarrhea, visual issues, headaches, numbness or tingling. No urinary issues reported like urgency, frequency, or discomfort upon urination.  No significant weight changes reported.  No swelling reported.  No rashes or any other skin issues reported. No emotional issues or insomnia.    The following portions of the patient's history were reviewed and   updated as appropriate: allergies, current medications, past family history, past medical history, past social history, past surgical history, and problem list.    Compared to one year ago, the patient's physical   health is the same.  Compared to one year ago, the patient's mental   health is the same.    Recent Hospitalizations:  She was not admitted to the hospital during the last year.     Current Medical Providers:  Patient Care Team:  Effie Quinteros MD as PCP - General (Family Medicine)  Eugenie Sanchez MD as Consulting Physician (Rheumatology)  Anjana Sweet OD (Optometry)    Outpatient Medications Prior to Visit   Medication Sig Dispense Refill    cyclobenzaprine (FLEXERIL) 10 MG tablet Take 1 tablet by mouth 3 (Three) Times a Day As Needed for Muscle Spasms.      estradiol (CLIMARA) 0.05 MG/24HR patch Place 1 patch on the skin as directed by provider 1 (One) Time Per Week.       hydroxychloroquine (PLAQUENIL) 200 MG tablet Take 1 tablet by mouth Daily. One and a half      meloxicam (MOBIC) 7.5 MG tablet Take 1 tablet by mouth Daily. 90 tablet 1    Synthroid 75 MCG tablet Take 1 tablet by mouth Daily. 90 tablet 1     Facility-Administered Medications Prior to Visit   Medication Dose Route Frequency Provider Last Rate Last Admin    cyanocobalamin injection 1,000 mcg  1,000 mcg Intramuscular Q28 Days Effie Quinteros MD   1,000 mcg at 09/06/24 0953     No opioid medication identified on active medication list. I have reviewed chart for other potential  high risk medication/s and harmful drug interactions in the elderly.      Aspirin is not on active medication list.  Aspirin use is not indicated based on review of current medical condition/s. Risk of harm outweighs potential benefits.  .    Patient Active Problem List   Diagnosis    Acquired hypothyroidism    B12 deficiency    Cervical spondylitis    Fibromyalgia    Hormone replacement therapy (HRT)    Insomnia    Multiple-type hyperlipidemia    Osteopenia    SLE (systemic lupus erythematosus)    Thyroid nodule    Vitamin D deficiency    New daily persistent headache    Meningioma    Elevated blood pressure reading    Pernicious anemia    Medicare annual wellness visit, subsequent    Visit for screening mammogram     Advance Care Planning Advance Directive is on file.  ACP discussion was held with the patient during this visit. Patient has an advance directive in EMR which is still valid.         Review of Systems   Constitutional:  Negative for activity change, fatigue and fever.   Respiratory:  Negative for cough, shortness of breath and wheezing.    Cardiovascular:  Negative for chest pain, palpitations and leg swelling.   Gastrointestinal:  Negative for constipation, diarrhea and indigestion.   Skin:  Negative for color change, dry skin and rash.   Neurological:  Negative for tremors and headache.         Objective   Vitals:    09/06/24  "0910   BP: 144/77   BP Location: Right arm   Patient Position: Sitting   Cuff Size: Adult   Pulse: 94   Resp: 16   Temp: 97.7 °F (36.5 °C)   TempSrc: Infrared   SpO2: 98%   Weight: 71.2 kg (156 lb 14.4 oz)   Height: 162.6 cm (64\")   PainSc: 0-No pain       Estimated body mass index is 26.93 kg/m² as calculated from the following:    Height as of this encounter: 162.6 cm (64\").    Weight as of this encounter: 71.2 kg (156 lb 14.4 oz).       Physical Exam  Vitals and nursing note reviewed.   Constitutional:       General: She is not in acute distress.     Appearance: Normal appearance. She is well-developed. She is not ill-appearing.   HENT:      Head: Normocephalic and atraumatic.   Cardiovascular:      Rate and Rhythm: Normal rate and regular rhythm.      Heart sounds: Normal heart sounds. No murmur heard.     No gallop.   Pulmonary:      Effort: Pulmonary effort is normal. No respiratory distress.      Breath sounds: Normal breath sounds. No wheezing, rhonchi or rales.   Chest:      Chest wall: No tenderness.   Musculoskeletal:      Cervical back: Normal range of motion and neck supple.   Neurological:      General: No focal deficit present.      Mental Status: She is alert and oriented to person, place, and time. Mental status is at baseline.   Psychiatric:         Mood and Affect: Mood normal.            Does the patient have evidence of cognitive impairment? No                                                                                               Health  Risk Assessment    Smoking Status:  Social History     Tobacco Use   Smoking Status Never    Passive exposure: Never   Smokeless Tobacco Never     Alcohol Consumption:  Social History     Substance and Sexual Activity   Alcohol Use Yes    Alcohol/week: 1.0 standard drink of alcohol    Types: 1 Glasses of wine per week    Comment: Social       Fall Risk Screen  STEADI Fall Risk Assessment was completed, and patient is at LOW risk for falls.Assessment " completed on:3/11/2024    Depression Screenin/6/2024     9:09 AM   PHQ-2/PHQ-9 Depression Screening   Little Interest or Pleasure in Doing Things 0-->not at all   Feeling Down, Depressed or Hopeless 0-->not at all   PHQ-9: Brief Depression Severity Measure Score 0     Health Habits and Functional and Cognitive Screenin/6/2024     9:21 AM   Functional & Cognitive Status   Do you have difficulty preparing food and eating? No   Do you have difficulty bathing yourself, getting dressed or grooming yourself? No   Do you have difficulty using the toilet? No   Do you have difficulty moving around from place to place? No   Do you have trouble with steps or getting out of a bed or a chair? No   Current Diet Well Balanced Diet   Dental Exam Up to date   Eye Exam Up to date   Exercise (times per week) 3 times per week   Current Exercises Include Gardening   Do you need help using the phone?  No   Are you deaf or do you have serious difficulty hearing?  No   Do you need help to go to places out of walking distance? No   Do you need help shopping? No   Do you need help preparing meals?  No   Do you need help with housework?  Yes   Do you need help with laundry? No   Do you need help taking your medications? No   Do you need help managing money? No   Do you ever drive or ride in a car without wearing a seat belt? No   Have you felt unusual stress, anger or loneliness in the last month? No   Who do you live with? Spouse   If you need help, do you have trouble finding someone available to you? No   Do you have difficulty concentrating, remembering or making decisions? No           Age-appropriate Screening Schedule:  Refer to the list below for future screening recommendations based on patient's age, sex and/or medical conditions. Orders for these recommended tests are listed in the plan section. The patient has been provided with a written plan.    Health Maintenance List  Health Maintenance   Topic Date Due     COVID-19 Vaccine (4 - 2023-24 season) 09/01/2024    INFLUENZA VACCINE  08/01/2024    Pneumococcal Vaccine 65+ (3 of 3 - PPSV23 or PCV20) 03/11/2025 (Originally 10/4/2023)    TDAP/TD VACCINES (1 - Tdap) 05/29/2025 (Originally 8/9/1974)    LIPID PANEL  03/11/2025    BMI FOLLOWUP  03/11/2025    ANNUAL WELLNESS VISIT  09/06/2025    MAMMOGRAM  10/03/2025    DXA SCAN  10/03/2025    COLORECTAL CANCER SCREENING  04/06/2032    HEPATITIS C SCREENING  Completed    ZOSTER VACCINE  Completed                                                                                                                                                CMS Preventative Services Quick Reference  Risk Factors Identified During Encounter  Fall Risk-High or Moderate: Discussed Fall Prevention in the home  Immunizations Discussed/Encouraged: COVID19 and RSV (Respiratory Syncytial Virus)  Dental Screening Recommended  Vision Screening Recommended    The above risks/problems have been discussed with the patient.  Pertinent information has been shared with the patient in the After Visit Summary.  An After Visit Summary and PPPS were made available to the patient.    Follow Up:   Next Medicare Wellness visit to be scheduled in 1 year.     Assessment & Plan  Medicare annual wellness visit, subsequent    Visit for screening mammogram    Acquired hypothyroidism    Vitamin D deficiency      Medicare monitoring was done today.  I will be getting fasting blood work.  I reviewed her health maintenance.  Her last mammogram was in 10/2023 and the new order was given and patient will be scheduling the test down the road.  Her last DEXA scan was in 10/2023 and showed some mild bone loss in osteopenia range.  Patient is to continue calcium and vitamin D supplementation.  Her last colonoscopy was in 4/2022 and 3-year follow-up colonoscopy was recommended.  Immunization was also reviewed and recommended.         Orders Placed This Encounter   Procedures    Mammo Screening  Digital Tomosynthesis Bilateral With CAD    CBC Auto Differential    Comprehensive Metabolic Panel    Lipid Panel    TSH    Vitamin D,25-Hydroxy         Follow Up:     Return in about 6 months (around 3/6/2025) for Next scheduled follow up.    Effie Quinteros MD  09/06/2024

## 2024-09-07 DIAGNOSIS — R73.9 ELEVATED BLOOD SUGAR: Primary | ICD-10-CM

## 2024-09-07 LAB — HBA1C MFR BLD: 5.8 % (ref 4.8–5.6)

## 2024-09-11 DIAGNOSIS — E03.9 ACQUIRED HYPOTHYROIDISM: ICD-10-CM

## 2024-09-11 RX ORDER — LEVOTHYROXINE SODIUM 75 MCG
75 TABLET ORAL DAILY
Qty: 90 TABLET | Refills: 1 | Status: SHIPPED | OUTPATIENT
Start: 2024-09-11

## 2024-09-12 ENCOUNTER — TELEPHONE (OUTPATIENT)
Dept: NEUROSURGERY | Facility: CLINIC | Age: 69
End: 2024-09-12
Payer: MEDICARE

## 2024-09-12 NOTE — TELEPHONE ENCOUNTER
I called and talked to Courtney to let her know per Dr. Matos there were no significant changes on her MRI and if she needed to go more in depth I could make her an appmt. However she stated she understood and wanted to know when she should follow-up with Dr. Matos - one year follow-up with MRI?

## 2024-10-22 DIAGNOSIS — M32.9 SYSTEMIC LUPUS ERYTHEMATOSUS, UNSPECIFIED SLE TYPE, UNSPECIFIED ORGAN INVOLVEMENT STATUS: ICD-10-CM

## 2024-10-22 RX ORDER — MELOXICAM 7.5 MG/1
7.5 TABLET ORAL DAILY
Qty: 90 TABLET | Refills: 1 | Status: SHIPPED | OUTPATIENT
Start: 2024-10-22

## 2024-10-28 ENCOUNTER — HOSPITAL ENCOUNTER (OUTPATIENT)
Dept: MAMMOGRAPHY | Facility: HOSPITAL | Age: 69
Discharge: HOME | End: 2024-10-28
Admitting: FAMILY MEDICINE
Payer: MEDICARE

## 2024-10-28 DIAGNOSIS — Z12.31 VISIT FOR SCREENING MAMMOGRAM: ICD-10-CM

## 2024-10-28 PROCEDURE — 77063 BREAST TOMOSYNTHESIS BI: CPT

## 2024-10-28 PROCEDURE — 77067 SCR MAMMO BI INCL CAD: CPT

## 2025-02-25 ENCOUNTER — CLINICAL SUPPORT (OUTPATIENT)
Dept: FAMILY MEDICINE CLINIC | Facility: CLINIC | Age: 70
End: 2025-02-25
Payer: MEDICARE

## 2025-02-25 ENCOUNTER — LAB (OUTPATIENT)
Dept: FAMILY MEDICINE CLINIC | Facility: CLINIC | Age: 70
End: 2025-02-25
Payer: MEDICARE

## 2025-02-25 DIAGNOSIS — E53.8 B12 DEFICIENCY: Primary | ICD-10-CM

## 2025-02-25 PROCEDURE — 96372 THER/PROPH/DIAG INJ SC/IM: CPT | Performed by: FAMILY MEDICINE

## 2025-02-25 RX ORDER — CYANOCOBALAMIN 1000 UG/ML
1000 INJECTION, SOLUTION INTRAMUSCULAR; SUBCUTANEOUS
Status: SHIPPED | OUTPATIENT
Start: 2025-02-25

## 2025-02-25 RX ADMIN — CYANOCOBALAMIN 1000 MCG: 1000 INJECTION, SOLUTION INTRAMUSCULAR; SUBCUTANEOUS at 11:16

## 2025-02-25 NOTE — PROGRESS NOTES
Injection  Injection performed in Left Deltoid by Aly Cavanaugh MA. Patient tolerated the procedure well without complications.  02/25/25   Aly Cavanaugh MA

## 2025-03-17 DIAGNOSIS — E03.9 ACQUIRED HYPOTHYROIDISM: ICD-10-CM

## 2025-03-17 RX ORDER — LEVOTHYROXINE SODIUM 75 MCG
75 TABLET ORAL DAILY
Qty: 90 TABLET | Refills: 0 | Status: SHIPPED | OUTPATIENT
Start: 2025-03-17

## 2025-03-20 ENCOUNTER — OFFICE VISIT (OUTPATIENT)
Dept: FAMILY MEDICINE CLINIC | Facility: CLINIC | Age: 70
End: 2025-03-20
Payer: MEDICARE

## 2025-03-20 VITALS
OXYGEN SATURATION: 99 % | DIASTOLIC BLOOD PRESSURE: 84 MMHG | HEART RATE: 90 BPM | WEIGHT: 156.2 LBS | TEMPERATURE: 98.7 F | SYSTOLIC BLOOD PRESSURE: 159 MMHG | RESPIRATION RATE: 16 BRPM | HEIGHT: 64 IN | BODY MASS INDEX: 26.67 KG/M2

## 2025-03-20 DIAGNOSIS — R73.03 PREDIABETES: ICD-10-CM

## 2025-03-20 DIAGNOSIS — E03.9 ACQUIRED HYPOTHYROIDISM: ICD-10-CM

## 2025-03-20 DIAGNOSIS — I10 PRIMARY HYPERTENSION: Primary | ICD-10-CM

## 2025-03-20 DIAGNOSIS — E78.2 MULTIPLE-TYPE HYPERLIPIDEMIA: ICD-10-CM

## 2025-03-20 RX ORDER — AMLODIPINE BESYLATE 2.5 MG/1
2.5 TABLET ORAL DAILY
Qty: 30 TABLET | Refills: 0 | Status: SHIPPED | OUTPATIENT
Start: 2025-03-20

## 2025-03-20 RX ORDER — OXYCODONE HYDROCHLORIDE 10 MG/1
5 TABLET ORAL
COMMUNITY
Start: 2025-03-04

## 2025-03-20 RX ORDER — METHOCARBAMOL 500 MG/1
500 TABLET, FILM COATED ORAL 4 TIMES DAILY
COMMUNITY

## 2025-03-20 RX ADMIN — CYANOCOBALAMIN 1000 MCG: 1000 INJECTION, SOLUTION INTRAMUSCULAR; SUBCUTANEOUS at 12:08

## 2025-03-20 NOTE — PROGRESS NOTES
Subjective   Chief Complaint   Patient presents with    Hospital Follow Up Visit     HOSPITAL FOLLOW UP BACK SURGERY [place] 3/3/25. BP AND BLOOD SUGAR CONCERNS. Had 8 lumbar injections from Oct-January    Med Refill     Estradiol patch    Prediabetes    Hyperlipidemia    Hypertension     Courtney Reis is a 69 y.o. female.     Patient Care Team:  Effie Quinteros MD as PCP - General (Family Medicine)  Daniel, Eugenie Grayson MD as Consulting Physician (Rheumatology)  Anjana Sweet OD (Optometry)    History of Present Illness  She is coming in today to follow-up on the recent lower back surgery which she underwent on 3/3/2025 in [facility].  Patient subsequently was readmitted to the hospital due to bowel obstruction.  She has been home now for about 10 days.  She reports that while in the hospital she was advised that her blood sugar was elevated and also her blood pressure was elevated, she has checked her blood pressure at home and still is getting some elevated blood pressure readings.  She is still on oxycodone to help with pain, but she is trying to spread that out.  She is scheduled to follow-up with her surgeon next week.  She reports that over the last several months or maybe even longer she has gained some weight, this is really bothering her and she would like to lose some weight and wants to improve the diet and plans to start being more physically active when she recovers from her surgery.       The following portions of the patient's history were reviewed and updated as appropriate: allergies, current medications, past family history, past medical history, past social history, past surgical history, and problem list.  Past Medical History:   Diagnosis Date    Allergic     Seasonal    Anemia     Arthritis     Cancer     Precancerous cells    Disease of thyroid gland     Fibromyalgia     Fibromyalgia, primary     History of medical problems     Lupus, pernicuous anemia    Hyperlipidemia   "   Hypothyroidism     Low back pain     Spinal stenosis    Lupus     Medicare annual wellness visit, subsequent 09/06/2024    Osteopenia     Pernicious anemia      Past Surgical History:   Procedure Laterality Date    ANKLE SURGERY      right ankle    COLONOSCOPY      HYSTERECTOMY      due to precancerous cells    LUMBAR SPINE SURGERY  2019    fusion on few levels    LUMBAR SPINE SURGERY  03/03/2025    again fusion on several levels    SPINE SURGERY      Multi level fusion    TONSILLECTOMY       The patient has a family history of  Family History   Problem Relation Age of Onset    Breast cancer Mother     Breast cancer Cousin     Colon cancer Brother      Social History     Socioeconomic History    Marital status:    Tobacco Use    Smoking status: Never     Passive exposure: Never    Smokeless tobacco: Never   Vaping Use    Vaping status: Never Used   Substance and Sexual Activity    Alcohol use: Yes     Alcohol/week: 1.0 standard drink of alcohol     Types: 1 Glasses of wine per week     Comment: Social    Drug use: Never    Sexual activity: Defer       Review of Systems   Constitutional:  Negative for activity change, fatigue and fever.   Respiratory:  Negative for shortness of breath and wheezing.    Cardiovascular:  Negative for chest pain, palpitations and leg swelling.   Musculoskeletal:  Negative for arthralgias and back pain.   Skin:  Negative for rash.   Neurological:  Negative for tremors and headache.     Visit Vitals  /84 (BP Location: Left arm, Patient Position: Sitting, Cuff Size: Adult)   Pulse 90   Temp 98.7 °F (37.1 °C) (Infrared)   Resp 16   Ht 162.6 cm (64.02\")   Wt 70.9 kg (156 lb 3.2 oz)   SpO2 99%   BMI 26.80 kg/m²       BMI is >= 25 and <30. (Overweight) The following options were offered after discussion;: exercise counseling/recommendations and nutrition counseling/recommendations      Current Outpatient Medications:     estradiol (CLIMARA) 0.05 MG/24HR patch, Place 1 patch on " the skin as directed by provider 1 (One) Time Per Week., Disp: , Rfl:     hydroxychloroquine (PLAQUENIL) 200 MG tablet, Take 1 tablet by mouth Daily. One and a half, Disp: , Rfl:     meloxicam (MOBIC) 7.5 MG tablet, Take 1 tablet by mouth Daily., Disp: 90 tablet, Rfl: 1    methocarbamol (ROBAXIN) 500 MG tablet, Take 1 tablet by mouth 4 (Four) Times a Day., Disp: , Rfl:     oxyCODONE (ROXICODONE) 10 MG tablet, Take 0.5 tablets by mouth., Disp: , Rfl:     Synthroid 75 MCG tablet, TAKE 1 TABLET BY MOUTH DAILY, Disp: 90 tablet, Rfl: 0    amLODIPine (NORVASC) 2.5 MG tablet, Take 1 tablet by mouth Daily., Disp: 30 tablet, Rfl: 0    Current Facility-Administered Medications:     cyanocobalamin injection 1,000 mcg, 1,000 mcg, Intramuscular, Q28 Days, Effie Quinteros MD, 1,000 mcg at 03/20/25 1208    Objective   Physical Exam  Vitals and nursing note reviewed.   Constitutional:       General: She is not in acute distress.     Appearance: Normal appearance. She is well-developed. She is not ill-appearing.   HENT:      Head: Normocephalic and atraumatic.   Cardiovascular:      Rate and Rhythm: Normal rate and regular rhythm.      Heart sounds: Normal heart sounds. No murmur heard.     No gallop.   Pulmonary:      Effort: Pulmonary effort is normal. No respiratory distress.      Breath sounds: Normal breath sounds. No wheezing, rhonchi or rales.   Chest:      Chest wall: No tenderness.   Musculoskeletal:      Cervical back: Normal range of motion and neck supple.   Neurological:      General: No focal deficit present.      Mental Status: She is alert and oriented to person, place, and time. Mental status is at baseline.   Psychiatric:         Mood and Affect: Mood normal.       Lipid Panel          9/6/2024    10:00   Lipid Panel   Total Cholesterol 216    Triglycerides 112    HDL Cholesterol 76    VLDL Cholesterol 19    LDL Cholesterol  121    LDL/HDL Ratio 1.55      Most Recent A1C          9/6/2024    10:00   HGBA1C Most  Recent   Hemoglobin A1C 5.80        FOLLOWING LABS WERE REVIEWED TODAY:  CMP          9/6/2024    10:00   CMP   Glucose 114    BUN 20    Creatinine 0.89    EGFR 70.7    Sodium 138    Potassium 4.8    Chloride 103    Calcium 9.8    Total Protein 7.3    Albumin 4.9    Globulin 2.4    Total Bilirubin 0.4    Alkaline Phosphatase 109    AST (SGOT) 27    ALT (SGPT) 35    Albumin/Globulin Ratio 2.0    BUN/Creatinine Ratio 22.5    Anion Gap 12.0      CBC          9/6/2024    10:00   CBC   WBC 5.45    RBC 5.04    Hemoglobin 14.7    Hematocrit 43.8    MCV 86.9    MCH 29.2    MCHC 33.6    RDW 13.2    Platelets 286           Assessment & Plan   Diagnoses and all orders for this visit:    1. Primary hypertension (Primary)  -     amLODIPine (NORVASC) 2.5 MG tablet; Take 1 tablet by mouth Daily.  Dispense: 30 tablet; Refill: 0    2. Prediabetes  -     Hemoglobin A1c    3. Acquired hypothyroidism  -     Comprehensive Metabolic Panel  -     CBC Auto Differential  -     Lipid Panel  -     TSH    4. Multiple-type hyperlipidemia  -     Comprehensive Metabolic Panel  -     CBC Auto Differential  -     Lipid Panel      Patient is now recovering from her lumbar spine surgery, she reports undergoing fusion on several levels on 3/3/2025.  Patient reports that she has a follow-up appointment with her surgeon next week.  I also addressed her blood pressure which is not well-controlled and I will be starting her on a small dose of amlodipine and she will continue to monitor her blood pressure at home.  I will be also getting fasting blood work.  Her hemoglobin A1c was slightly elevated in prediabetes range when the blood work in our office was done last time about 6 months ago.      Return in about 1 month (around 4/20/2025) for Next scheduled follow up.    Requested Prescriptions     Signed Prescriptions Disp Refills    amLODIPine (NORVASC) 2.5 MG tablet 30 tablet 0     Sig: Take 1 tablet by mouth Daily.       Effie Quinteros,  MD  03/20/2025  11:39 EDT

## 2025-03-20 NOTE — PROGRESS NOTES
Injection  Injection performed in [facility] by Jacklyn Simon MA. Patient tolerated the procedure well without complications.  03/20/25   Jacklyn Simon MA

## 2025-04-09 ENCOUNTER — LAB (OUTPATIENT)
Dept: FAMILY MEDICINE CLINIC | Facility: CLINIC | Age: 70
End: 2025-04-09
Payer: MEDICARE

## 2025-04-09 LAB
ALBUMIN SERPL-MCNC: 4.7 G/DL (ref 3.5–5.2)
ALBUMIN/GLOB SERPL: 1.7 G/DL
ALP SERPL-CCNC: 122 U/L (ref 39–117)
ALT SERPL W P-5'-P-CCNC: 32 U/L (ref 1–33)
ANION GAP SERPL CALCULATED.3IONS-SCNC: 11.8 MMOL/L (ref 5–15)
AST SERPL-CCNC: 28 U/L (ref 1–32)
BASOPHILS # BLD AUTO: 0.06 10*3/MM3 (ref 0–0.2)
BASOPHILS NFR BLD AUTO: 1 % (ref 0–1.5)
BILIRUB SERPL-MCNC: 0.3 MG/DL (ref 0–1.2)
BUN SERPL-MCNC: 14 MG/DL (ref 8–23)
BUN/CREAT SERPL: 15.4 (ref 7–25)
CALCIUM SPEC-SCNC: 10 MG/DL (ref 8.6–10.5)
CHLORIDE SERPL-SCNC: 102 MMOL/L (ref 98–107)
CHOLEST SERPL-MCNC: 219 MG/DL (ref 0–200)
CO2 SERPL-SCNC: 25.2 MMOL/L (ref 22–29)
CREAT SERPL-MCNC: 0.91 MG/DL (ref 0.57–1)
DEPRECATED RDW RBC AUTO: 40.1 FL (ref 37–54)
EGFRCR SERPLBLD CKD-EPI 2021: 68.9 ML/MIN/1.73
EOSINOPHIL # BLD AUTO: 0.12 10*3/MM3 (ref 0–0.4)
EOSINOPHIL NFR BLD AUTO: 2 % (ref 0.3–6.2)
ERYTHROCYTE [DISTWIDTH] IN BLOOD BY AUTOMATED COUNT: 12.7 % (ref 12.3–15.4)
GLOBULIN UR ELPH-MCNC: 2.7 GM/DL
GLUCOSE SERPL-MCNC: 114 MG/DL (ref 65–99)
HBA1C MFR BLD: 5.7 % (ref 4.8–5.6)
HCT VFR BLD AUTO: 41.6 % (ref 34–46.6)
HDLC SERPL-MCNC: 73 MG/DL (ref 40–60)
HGB BLD-MCNC: 13.8 G/DL (ref 12–15.9)
IMM GRANULOCYTES # BLD AUTO: 0.01 10*3/MM3 (ref 0–0.05)
IMM GRANULOCYTES NFR BLD AUTO: 0.2 % (ref 0–0.5)
LDLC SERPL CALC-MCNC: 120 MG/DL (ref 0–100)
LDLC/HDLC SERPL: 1.59 {RATIO}
LYMPHOCYTES # BLD AUTO: 1.67 10*3/MM3 (ref 0.7–3.1)
LYMPHOCYTES NFR BLD AUTO: 28.2 % (ref 19.6–45.3)
MCH RBC QN AUTO: 28.9 PG (ref 26.6–33)
MCHC RBC AUTO-ENTMCNC: 33.2 G/DL (ref 31.5–35.7)
MCV RBC AUTO: 87.2 FL (ref 79–97)
MONOCYTES # BLD AUTO: 0.53 10*3/MM3 (ref 0.1–0.9)
MONOCYTES NFR BLD AUTO: 9 % (ref 5–12)
NEUTROPHILS NFR BLD AUTO: 3.53 10*3/MM3 (ref 1.7–7)
NEUTROPHILS NFR BLD AUTO: 59.6 % (ref 42.7–76)
NRBC BLD AUTO-RTO: 0 /100 WBC (ref 0–0.2)
PLATELET # BLD AUTO: 274 10*3/MM3 (ref 140–450)
PMV BLD AUTO: 11.7 FL (ref 6–12)
POTASSIUM SERPL-SCNC: 4.3 MMOL/L (ref 3.5–5.2)
PROT SERPL-MCNC: 7.4 G/DL (ref 6–8.5)
RBC # BLD AUTO: 4.77 10*6/MM3 (ref 3.77–5.28)
SODIUM SERPL-SCNC: 139 MMOL/L (ref 136–145)
TRIGL SERPL-MCNC: 151 MG/DL (ref 0–150)
TSH SERPL DL<=0.05 MIU/L-ACNC: 2.42 UIU/ML (ref 0.27–4.2)
VLDLC SERPL-MCNC: 26 MG/DL (ref 5–40)
WBC NRBC COR # BLD AUTO: 5.92 10*3/MM3 (ref 3.4–10.8)

## 2025-04-09 PROCEDURE — 83036 HEMOGLOBIN GLYCOSYLATED A1C: CPT | Performed by: FAMILY MEDICINE

## 2025-04-09 PROCEDURE — 85025 COMPLETE CBC W/AUTO DIFF WBC: CPT | Performed by: FAMILY MEDICINE

## 2025-04-09 PROCEDURE — 80053 COMPREHEN METABOLIC PANEL: CPT | Performed by: FAMILY MEDICINE

## 2025-04-09 PROCEDURE — 84443 ASSAY THYROID STIM HORMONE: CPT | Performed by: FAMILY MEDICINE

## 2025-04-09 PROCEDURE — 80061 LIPID PANEL: CPT | Performed by: FAMILY MEDICINE

## 2025-04-16 ENCOUNTER — OFFICE VISIT (OUTPATIENT)
Dept: FAMILY MEDICINE CLINIC | Facility: CLINIC | Age: 70
End: 2025-04-16
Payer: MEDICARE

## 2025-04-16 VITALS
TEMPERATURE: 98.2 F | HEIGHT: 64 IN | HEART RATE: 94 BPM | OXYGEN SATURATION: 98 % | WEIGHT: 154.6 LBS | DIASTOLIC BLOOD PRESSURE: 92 MMHG | BODY MASS INDEX: 26.4 KG/M2 | SYSTOLIC BLOOD PRESSURE: 164 MMHG | RESPIRATION RATE: 16 BRPM

## 2025-04-16 DIAGNOSIS — N95.1 MENOPAUSAL SYNDROME (HOT FLUSHES): ICD-10-CM

## 2025-04-16 DIAGNOSIS — I10 PRIMARY HYPERTENSION: Primary | ICD-10-CM

## 2025-04-16 DIAGNOSIS — R73.03 PREDIABETES: ICD-10-CM

## 2025-04-16 DIAGNOSIS — F41.9 ANXIETY: ICD-10-CM

## 2025-04-16 RX ORDER — AMLODIPINE BESYLATE 5 MG/1
5 TABLET ORAL DAILY
Qty: 30 TABLET | Refills: 0 | Status: SHIPPED | OUTPATIENT
Start: 2025-04-16

## 2025-04-16 RX ORDER — ESTRADIOL 0.05 MG/D
1 PATCH TRANSDERMAL WEEKLY
Qty: 4 PATCH | Refills: 0 | Status: SHIPPED | OUTPATIENT
Start: 2025-04-16

## 2025-04-16 NOTE — PROGRESS NOTES
Subjective   Chief Complaint   Patient presents with    Hyperlipidemia    Hypothyroidism    Hypertension    Prediabetes    Anxiety    Insomnia     Not sleeping well.     Courtney Reis is a 69 y.o. female.     Patient Care Team:  Effie Quinteros MD as PCP - General (Family Medicine)  Eugenie Sanchez MD as Consulting Physician (Rheumatology)  Anjana Sweet OD (Optometry)    History of Present Illness  She is coming in today to follow-up on her chronic medical problems and her medications including hypertension, hyperlipidemia, hypothyroidism, and prediabetes.  Patient recently had fasting blood work done and these results are being reviewed and discussed today.  She reports that for some time she has been under a lot of stress and she has noted buildup of anxiety and she would like to discuss to possibly be started on some medication for it.  She is currently on amlodipine 2.5 mg for her hypertension, her blood pressure today is still noted to be elevated.  Patient has suffered with insomnia for quite some time and this also seems to be getting worse and she wonders if this is affecting her overall wellbeing as well.  She tells me that for example last night she did not fall asleep until 6 AM and then she was getting up at 8 AM.  She also would like to get a refill for her estradiol patches which she previously was getting from her gynecologist who retired about 4 months ago.  Patient has been experiencing hot flashes and she is in the process to get established with a new gynecologist, but she did not get a chance to make that appointment.       The following portions of the patient's history were reviewed and updated as appropriate: allergies, current medications, past family history, past medical history, past social history, past surgical history, and problem list.  Past Medical History:   Diagnosis Date    Allergic     Seasonal    Anemia     Arthritis     Cancer     Precancerous cells    Disease of  "thyroid gland     Fibromyalgia     Fibromyalgia, primary     History of medical problems     Lupus, pernicuous anemia    Hyperlipidemia     Hypothyroidism     Low back pain     Spinal stenosis    Lupus     Medicare annual wellness visit, subsequent 09/06/2024    Osteopenia     Pernicious anemia      Past Surgical History:   Procedure Laterality Date    ANKLE SURGERY      right ankle    COLONOSCOPY      HYSTERECTOMY      due to precancerous cells    LUMBAR SPINE SURGERY  2019    fusion on few levels    LUMBAR SPINE SURGERY  03/03/2025    again fusion on several levels    SPINE SURGERY      Multi level fusion    TONSILLECTOMY       The patient has a family history of  Family History   Problem Relation Age of Onset    Breast cancer Mother     Breast cancer Cousin     Colon cancer Brother      Social History     Socioeconomic History    Marital status:    Tobacco Use    Smoking status: Never     Passive exposure: Never    Smokeless tobacco: Never   Vaping Use    Vaping status: Never Used   Substance and Sexual Activity    Alcohol use: Yes     Alcohol/week: 1.0 standard drink of alcohol     Types: 1 Glasses of wine per week     Comment: Social    Drug use: Never    Sexual activity: Defer       Review of Systems   Constitutional:  Negative for activity change, fatigue and fever.   Respiratory:  Negative for shortness of breath and wheezing.    Cardiovascular:  Negative for chest pain, palpitations and leg swelling.   Musculoskeletal:  Negative for arthralgias and back pain.   Skin:  Negative for rash.   Neurological:  Negative for tremors and headache.   Psychiatric/Behavioral:  Positive for sleep disturbance and stress. The patient is nervous/anxious.      Visit Vitals  /92 (BP Location: Left arm, Patient Position: Sitting, Cuff Size: Adult)   Pulse 94   Temp 98.2 °F (36.8 °C) (Infrared)   Resp 16   Ht 162.6 cm (64.02\")   Wt 70.1 kg (154 lb 9.6 oz)   SpO2 98%   BMI 26.52 kg/m²              Current Outpatient " Medications:     estradiol (CLIMARA) 0.05 MG/24HR patch, Place 1 patch on the skin as directed by provider 1 (One) Time Per Week., Disp: 4 patch, Rfl: 0    hydroxychloroquine (PLAQUENIL) 200 MG tablet, Take 1 tablet by mouth Daily. One and a half, Disp: , Rfl:     meloxicam (MOBIC) 7.5 MG tablet, Take 1 tablet by mouth Daily., Disp: 90 tablet, Rfl: 1    methocarbamol (ROBAXIN) 500 MG tablet, Take 1 tablet by mouth 4 (Four) Times a Day., Disp: , Rfl:     Synthroid 75 MCG tablet, TAKE 1 TABLET BY MOUTH DAILY, Disp: 90 tablet, Rfl: 0    amLODIPine (NORVASC) 5 MG tablet, Take 1 tablet by mouth Daily., Disp: 30 tablet, Rfl: 0    sertraline (Zoloft) 50 MG tablet, Take 1 tablet by mouth Daily., Disp: 30 tablet, Rfl: 0    Current Facility-Administered Medications:     cyanocobalamin injection 1,000 mcg, 1,000 mcg, Intramuscular, Q28 Days, Effie Quinteros MD, 1,000 mcg at 03/20/25 1208    Objective   Physical Exam  Vitals and nursing note reviewed.   Constitutional:       General: She is not in acute distress.     Appearance: Normal appearance. She is well-developed. She is not ill-appearing.   HENT:      Head: Normocephalic and atraumatic.   Cardiovascular:      Rate and Rhythm: Normal rate and regular rhythm.      Heart sounds: Normal heart sounds. No murmur heard.     No gallop.   Pulmonary:      Effort: Pulmonary effort is normal. No respiratory distress.      Breath sounds: Normal breath sounds. No wheezing, rhonchi or rales.   Chest:      Chest wall: No tenderness.   Musculoskeletal:      Cervical back: Normal range of motion and neck supple.   Neurological:      General: No focal deficit present.      Mental Status: She is alert and oriented to person, place, and time. Mental status is at baseline.   Psychiatric:         Mood and Affect: Mood normal.           FOLLOWING LABS WERE REVIEWED TODAY:  CMP          9/6/2024    10:00 4/9/2025    09:02   CMP   Glucose 114  114    BUN 20  14    Creatinine 0.89  0.91    EGFR  70.7  68.9    Sodium 138  139    Potassium 4.8  4.3    Chloride 103  102    Calcium 9.8  10.0    Total Protein 7.3  7.4    Albumin 4.9  4.7    Globulin 2.4  2.7    Total Bilirubin 0.4  0.3    Alkaline Phosphatase 109  122    AST (SGOT) 27  28    ALT (SGPT) 35  32    Albumin/Globulin Ratio 2.0  1.7    BUN/Creatinine Ratio 22.5  15.4    Anion Gap 12.0  11.8      Lipid Panel          9/6/2024    10:00 4/9/2025    09:02   Lipid Panel   Total Cholesterol 216  219    Triglycerides 112  151    HDL Cholesterol 76  73    VLDL Cholesterol 19  26    LDL Cholesterol  121  120    LDL/HDL Ratio 1.55  1.59            Assessment & Plan   Diagnoses and all orders for this visit:    1. Primary hypertension (Primary)  -     amLODIPine (NORVASC) 5 MG tablet; Take 1 tablet by mouth Daily.  Dispense: 30 tablet; Refill: 0    2. Prediabetes    3. Anxiety  -     sertraline (Zoloft) 50 MG tablet; Take 1 tablet by mouth Daily.  Dispense: 30 tablet; Refill: 0    4. Menopausal syndrome (hot flushes)  -     estradiol (CLIMARA) 0.05 MG/24HR patch; Place 1 patch on the skin as directed by provider 1 (One) Time Per Week.  Dispense: 4 patch; Refill: 0      Her hypertension is not well-controlled and I will be increasing the dose of amlodipine from 2.5 mg to 5 mg.  We also talked about her stress which she has been doing with for some time and her anxiety which is poorly controlled and I will be starting her on small dose of sertraline.  I also refilled her estradiol patches and patient will be scheduling appointment with her new gynecologist.        Return in about 1 month (around 5/16/2025) for Next scheduled follow up.    Requested Prescriptions     Signed Prescriptions Disp Refills    sertraline (Zoloft) 50 MG tablet 30 tablet 0     Sig: Take 1 tablet by mouth Daily.    amLODIPine (NORVASC) 5 MG tablet 30 tablet 0     Sig: Take 1 tablet by mouth Daily.    estradiol (CLIMARA) 0.05 MG/24HR patch 4 patch 0     Sig: Place 1 patch on the skin as  directed by provider 1 (One) Time Per Week.       Effie Quinteros MD  04/16/2025  10:55 EDT

## 2025-04-17 DIAGNOSIS — I10 PRIMARY HYPERTENSION: ICD-10-CM

## 2025-04-17 RX ORDER — AMLODIPINE BESYLATE 2.5 MG/1
2.5 TABLET ORAL DAILY
Qty: 30 TABLET | Refills: 0 | OUTPATIENT
Start: 2025-04-17

## 2025-04-28 DIAGNOSIS — M32.9 SYSTEMIC LUPUS ERYTHEMATOSUS, UNSPECIFIED SLE TYPE, UNSPECIFIED ORGAN INVOLVEMENT STATUS: ICD-10-CM

## 2025-04-28 RX ORDER — MELOXICAM 7.5 MG/1
7.5 TABLET ORAL DAILY
Qty: 90 TABLET | Refills: 0 | Status: SHIPPED | OUTPATIENT
Start: 2025-04-28

## 2025-05-12 DIAGNOSIS — N95.1 MENOPAUSAL SYNDROME (HOT FLUSHES): ICD-10-CM

## 2025-05-12 RX ORDER — ESTRADIOL 0.05 MG/D
PATCH TRANSDERMAL
Qty: 4 PATCH | Refills: 0 | Status: SHIPPED | OUTPATIENT
Start: 2025-05-12

## 2025-05-14 DIAGNOSIS — I10 PRIMARY HYPERTENSION: ICD-10-CM

## 2025-05-14 DIAGNOSIS — F41.9 ANXIETY: ICD-10-CM

## 2025-05-14 RX ORDER — AMLODIPINE BESYLATE 5 MG/1
5 TABLET ORAL DAILY
Qty: 30 TABLET | Refills: 0 | Status: SHIPPED | OUTPATIENT
Start: 2025-05-14 | End: 2025-05-19

## 2025-05-19 ENCOUNTER — OFFICE VISIT (OUTPATIENT)
Dept: FAMILY MEDICINE CLINIC | Facility: CLINIC | Age: 70
End: 2025-05-19
Payer: MEDICARE

## 2025-05-19 VITALS
TEMPERATURE: 98 F | OXYGEN SATURATION: 98 % | HEART RATE: 93 BPM | HEIGHT: 64 IN | SYSTOLIC BLOOD PRESSURE: 172 MMHG | DIASTOLIC BLOOD PRESSURE: 94 MMHG | WEIGHT: 155.2 LBS | RESPIRATION RATE: 15 BRPM | BODY MASS INDEX: 26.5 KG/M2

## 2025-05-19 DIAGNOSIS — F51.01 PRIMARY INSOMNIA: ICD-10-CM

## 2025-05-19 DIAGNOSIS — F41.9 ANXIETY: ICD-10-CM

## 2025-05-19 DIAGNOSIS — M62.838 MUSCLE SPASM: ICD-10-CM

## 2025-05-19 DIAGNOSIS — I10 PRIMARY HYPERTENSION: Primary | ICD-10-CM

## 2025-05-19 PROCEDURE — 1159F MED LIST DOCD IN RCRD: CPT | Performed by: FAMILY MEDICINE

## 2025-05-19 PROCEDURE — 1160F RVW MEDS BY RX/DR IN RCRD: CPT | Performed by: FAMILY MEDICINE

## 2025-05-19 PROCEDURE — 1126F AMNT PAIN NOTED NONE PRSNT: CPT | Performed by: FAMILY MEDICINE

## 2025-05-19 PROCEDURE — 99214 OFFICE O/P EST MOD 30 MIN: CPT | Performed by: FAMILY MEDICINE

## 2025-05-19 PROCEDURE — G2211 COMPLEX E/M VISIT ADD ON: HCPCS | Performed by: FAMILY MEDICINE

## 2025-05-19 PROCEDURE — 3079F DIAST BP 80-89 MM HG: CPT | Performed by: FAMILY MEDICINE

## 2025-05-19 PROCEDURE — 3077F SYST BP >= 140 MM HG: CPT | Performed by: FAMILY MEDICINE

## 2025-05-19 RX ORDER — TIZANIDINE HYDROCHLORIDE 4 MG/1
4 CAPSULE, GELATIN COATED ORAL NIGHTLY
Qty: 30 CAPSULE | Refills: 0 | Status: SHIPPED | OUTPATIENT
Start: 2025-05-19 | End: 2025-05-21

## 2025-05-19 RX ORDER — AMLODIPINE BESYLATE 10 MG/1
10 TABLET ORAL DAILY
Qty: 30 TABLET | Refills: 0 | Status: SHIPPED | OUTPATIENT
Start: 2025-05-19

## 2025-05-19 NOTE — PROGRESS NOTES
Subjective   Chief Complaint   Patient presents with    Hypertension    Med Management     Discuss Zoloft. Discuss Hydroxyzine.    Anxiety    back spasps     Courtney Reis is a 69 y.o. female.     Patient Care Team:  Effie Quinteros MD as PCP - General (Family Medicine)  Daniel, Eugenie Grayson MD as Consulting Physician (Rheumatology)  Anjana Sweet OD (Optometry)    History of Present Illness  She is coming in today to follow-up on her hypertension and her anxiety and insomnia.  Patient is currently on amlodipine 5 mg a day, she checks her blood pressure periodically and gets systolic readings around 150.  Her blood pressure today is elevated.  She was also started on sertraline at her last appointment 1 month ago, but she tells me that since then she met with her back surgeon to follow-up on her lumbar spine surgery which was 11 weeks ago.  He preferred for her not to be on sertraline due to effect on the bone density and would like her to be possibly considered for something different instead.  Patient tells me that the majority of her anxiety is at night and it is triggered by the muscle spasms and insomnia.  She tells me that the surgeon suggested possibly to be on hydroxyzine.  However patient also reports that she has been taking lately tizanidine 6 mg at bedtime which was previously prescribed to her son-in-law physician and she tells me that when she takes the muscle relaxer at bedtime that helps for the muscle spasms and also helps her rest better at night.       The following portions of the patient's history were reviewed and updated as appropriate: allergies, current medications, past family history, past medical history, past social history, past surgical history, and problem list.  Past Medical History:   Diagnosis Date    Allergic     Seasonal    Anemia     Arthritis     Cancer     Precancerous cells    Disease of thyroid gland     Fibromyalgia     Fibromyalgia, primary     Headache      History of medical problems     Lupus, pernicuous anemia    Hyperlipidemia     Hypothyroidism     Low back pain     Spinal stenosis    Lupus     Medicare annual wellness visit, subsequent 2024    Osteopenia     Pernicious anemia      Past Surgical History:   Procedure Laterality Date    ANKLE SURGERY      right ankle    COLONOSCOPY      HYSTERECTOMY      due to precancerous cells    LUMBAR SPINE SURGERY  2019    fusion on few levels    LUMBAR SPINE SURGERY  2025    again fusion on several levels    SPINE SURGERY      Multi level fusion    TONSILLECTOMY       The patient has a family history of  Family History   Problem Relation Age of Onset    Breast cancer Mother     Arthritis Mother     Cancer Mother     Heart disease Mother     Hyperlipidemia Mother     Thyroid disease Mother     Vision loss Mother     Breast cancer Cousin     Colon cancer Brother     Cancer Father     Hyperlipidemia Father     Hypertension Father     Vision loss Father     Asthma Son     Asthma Daughter     Cancer Brother     Cancer Brother     COPD Brother     Early death Sister      Social History     Socioeconomic History    Marital status:    Tobacco Use    Smoking status: Former     Current packs/day: 0.00     Average packs/day: 0.3 packs/day for 2.0 years (0.5 ttl pk-yrs)     Types: Cigarettes     Quit date:      Years since quittin.4     Passive exposure: Never    Smokeless tobacco: Never    Tobacco comments:     Mid twenties social   Vaping Use    Vaping status: Never Used   Substance and Sexual Activity    Alcohol use: Yes     Alcohol/week: 1.0 standard drink of alcohol     Types: 1 Glasses of wine per week     Comment: Social drinking occassionally    Drug use: Never    Sexual activity: Yes     Partners: Male     Birth control/protection: Hysterectomy       Review of Systems   Constitutional:  Negative for activity change, fatigue and fever.   Respiratory:  Negative for shortness of breath and wheezing.   "  Cardiovascular:  Negative for chest pain, palpitations and leg swelling.   Musculoskeletal:  Positive for back pain. Negative for arthralgias.   Skin:  Negative for rash.   Neurological:  Negative for tremors and headache.   Psychiatric/Behavioral:  Positive for sleep disturbance and stress. The patient is nervous/anxious.      Visit Vitals  /94 (BP Location: Left arm, Patient Position: Sitting, Cuff Size: Adult)   Pulse 93   Temp 98 °F (36.7 °C) (Infrared)   Resp 15   Ht 162.6 cm (64.02\")   Wt 70.4 kg (155 lb 3.2 oz)   SpO2 98%   BMI 26.63 kg/m²              Current Outpatient Medications:     estradiol (CLIMARA) 0.05 MG/24HR patch, PLACE 1 PATCH ON THE SKIN EVERY WEEK, Disp: 4 patch, Rfl: 0    hydroxychloroquine (PLAQUENIL) 200 MG tablet, Take 1 tablet by mouth Daily. One and a half, Disp: , Rfl:     meloxicam (MOBIC) 7.5 MG tablet, TAKE 1 TABLET BY MOUTH DAILY, Disp: 90 tablet, Rfl: 0    Synthroid 75 MCG tablet, TAKE 1 TABLET BY MOUTH DAILY, Disp: 90 tablet, Rfl: 0    amLODIPine (NORVASC) 10 MG tablet, Take 1 tablet by mouth Daily., Disp: 30 tablet, Rfl: 0    TiZANidine (ZANAFLEX) 4 MG capsule, Take 1 capsule by mouth Every Night., Disp: 30 capsule, Rfl: 0    Current Facility-Administered Medications:     cyanocobalamin injection 1,000 mcg, 1,000 mcg, Intramuscular, Q28 Days, Effie Quinteros MD, 1,000 mcg at 03/20/25 1208    Objective   Physical Exam  Vitals and nursing note reviewed.   Constitutional:       General: She is not in acute distress.     Appearance: Normal appearance. She is well-developed. She is not ill-appearing.   HENT:      Head: Normocephalic and atraumatic.   Cardiovascular:      Rate and Rhythm: Normal rate and regular rhythm.      Heart sounds: Normal heart sounds. No murmur heard.     No gallop.   Pulmonary:      Effort: Pulmonary effort is normal. No respiratory distress.      Breath sounds: Normal breath sounds. No wheezing, rhonchi or rales.   Chest:      Chest wall: No " tenderness.   Musculoskeletal:      Cervical back: Normal range of motion and neck supple.   Neurological:      General: No focal deficit present.      Mental Status: She is alert and oriented to person, place, and time. Mental status is at baseline.   Psychiatric:         Mood and Affect: Mood normal.              FOLLOWING LABS WERE REVIEWED TODAY:  CMP          9/6/2024    10:00 4/9/2025    09:02   CMP   Glucose 114  114    BUN 20  14    Creatinine 0.89  0.91    EGFR 70.7  68.9    Sodium 138  139    Potassium 4.8  4.3    Chloride 103  102    Calcium 9.8  10.0    Total Protein 7.3  7.4    Albumin 4.9  4.7    Globulin 2.4  2.7    Total Bilirubin 0.4  0.3    Alkaline Phosphatase 109  122    AST (SGOT) 27  28    ALT (SGPT) 35  32    Albumin/Globulin Ratio 2.0  1.7    BUN/Creatinine Ratio 22.5  15.4    Anion Gap 12.0  11.8      Lipid Panel          9/6/2024    10:00 4/9/2025    09:02   Lipid Panel   Total Cholesterol 216  219    Triglycerides 112  151    HDL Cholesterol 76  73    VLDL Cholesterol 19  26    LDL Cholesterol  121  120    LDL/HDL Ratio 1.55  1.59              Assessment & Plan   Diagnoses and all orders for this visit:    1. Primary hypertension (Primary)  -     amLODIPine (NORVASC) 10 MG tablet; Take 1 tablet by mouth Daily.  Dispense: 30 tablet; Refill: 0    2. Primary insomnia    3. Anxiety    4. Muscle spasm  -     TiZANidine (ZANAFLEX) 4 MG capsule; Take 1 capsule by mouth Every Night.  Dispense: 30 capsule; Refill: 0        Her hypertension is poorly controlled.  I will be increasing her amlodipine from 5 mg to 10 mg and she will periodically monitor her blood pressure at home.  We also addressed her insomnia and anxiety, patient is off sertraline due to the concern of possible bone loss in review of her recovery from recent lumbar spine surgery as per her surgeon.  We talked about possibly starting hydroxyzine and we also discussed the muscle spasms and patient sleeps better with taking muscle  relaxer at bedtime.  I prescribed tizanidine 4 mg strength for her now.  I will reevaluate in 1 month or sooner if needed.      Return in about 1 month (around 6/19/2025) for Next scheduled follow up.    Requested Prescriptions     Signed Prescriptions Disp Refills    amLODIPine (NORVASC) 10 MG tablet 30 tablet 0     Sig: Take 1 tablet by mouth Daily.    TiZANidine (ZANAFLEX) 4 MG capsule 30 capsule 0     Sig: Take 1 capsule by mouth Every Night.       Effie Quinteros MD  05/19/2025  10:45 EDT

## 2025-05-21 ENCOUNTER — PRIOR AUTHORIZATION (OUTPATIENT)
Dept: FAMILY MEDICINE CLINIC | Facility: CLINIC | Age: 70
End: 2025-05-21
Payer: MEDICARE

## 2025-05-21 NOTE — TELEPHONE ENCOUNTER
PA for tiZANidine HCl 4MG capsules submitted on CoverMyMeds.  Insurance will not cover the capsules, it will cover:  Tizanidine tablets, baclofen 5/10/20mg tablets.

## 2025-06-03 ENCOUNTER — OFFICE VISIT (OUTPATIENT)
Dept: FAMILY MEDICINE CLINIC | Facility: CLINIC | Age: 70
End: 2025-06-03
Payer: MEDICARE

## 2025-06-03 VITALS
OXYGEN SATURATION: 99 % | RESPIRATION RATE: 18 BRPM | WEIGHT: 155.1 LBS | HEIGHT: 64 IN | SYSTOLIC BLOOD PRESSURE: 156 MMHG | BODY MASS INDEX: 26.48 KG/M2 | TEMPERATURE: 98.2 F | HEART RATE: 94 BPM | DIASTOLIC BLOOD PRESSURE: 80 MMHG

## 2025-06-03 DIAGNOSIS — Z13.6 ENCOUNTER FOR SCREENING FOR VASCULAR DISEASE: ICD-10-CM

## 2025-06-03 DIAGNOSIS — I10 PRIMARY HYPERTENSION: Primary | ICD-10-CM

## 2025-06-03 PROCEDURE — 1159F MED LIST DOCD IN RCRD: CPT | Performed by: NURSE PRACTITIONER

## 2025-06-03 PROCEDURE — 3079F DIAST BP 80-89 MM HG: CPT | Performed by: NURSE PRACTITIONER

## 2025-06-03 PROCEDURE — 1160F RVW MEDS BY RX/DR IN RCRD: CPT | Performed by: NURSE PRACTITIONER

## 2025-06-03 PROCEDURE — 99214 OFFICE O/P EST MOD 30 MIN: CPT | Performed by: NURSE PRACTITIONER

## 2025-06-03 PROCEDURE — 1126F AMNT PAIN NOTED NONE PRSNT: CPT | Performed by: NURSE PRACTITIONER

## 2025-06-03 PROCEDURE — 3077F SYST BP >= 140 MM HG: CPT | Performed by: NURSE PRACTITIONER

## 2025-06-03 RX ORDER — METOPROLOL SUCCINATE 25 MG/1
25 TABLET, EXTENDED RELEASE ORAL DAILY
Qty: 90 TABLET | Refills: 0 | Status: SHIPPED | OUTPATIENT
Start: 2025-06-03

## 2025-06-03 NOTE — PROGRESS NOTES
"Chief Complaint  spots on legs (Feels as if her legs are red and hot to the touch. Has started new BP med recently. )    Subjective        Courtneyjud Reis presents to [facility]  History of Present Illness    Swelling:  Onset of symptoms started 1 week ago. Status is no change/improved. Severity is mild.  Frequency is constant. Location: bilateral lower extremities.  Aggravating factors include nothing. Relieving factors include elevation.  Context includes recent increase in amlodipine to 10 mg daily.  Home blood pressure readings 140s/80s in mornings and evenings 130s/70s.  Hx of lupus and concerned about positive family history of cardiovascular disease.  Associated symptoms include erythema lower legs, warmth, and skin discoloration.  Pertinent negatives include appetite change, bleeding, blistering, bruising, chest pain, decreased mobility, diaphoresis, dyspnea, fatigue, fever, weakness, headache, joint pain, lymphadenopathy, malaise, myalgia, numbness, pruritus, purulent drainage, rash, and weight change.         Objective   Vital Signs:  /80 (BP Location: Left arm, Patient Position: Sitting, Cuff Size: Adult)   Pulse 94   Temp 98.2 °F (36.8 °C) (Temporal)   Resp 18   Ht 162.6 cm (64\")   Wt 70.4 kg (155 lb 1.6 oz)   SpO2 99%   BMI 26.62 kg/m²   Estimated body mass index is 26.62 kg/m² as calculated from the following:    Height as of this encounter: 162.6 cm (64\").    Weight as of this encounter: 70.4 kg (155 lb 1.6 oz).            Physical Exam  Constitutional:       General: She is not in acute distress.     Appearance: Normal appearance. She is well-groomed.   Cardiovascular:      Rate and Rhythm: Normal rate and regular rhythm.      Pulses: Normal pulses.      Heart sounds: S1 normal and S2 normal.      Comments: Trace bilateral lower extremity edema; faint erythema.  Pulmonary:      Effort: Pulmonary effort is normal.      Breath sounds: Normal breath sounds " and air entry.   Skin:     General: Skin is warm and dry.      Findings: No rash.   Neurological:      Mental Status: She is alert and oriented to person, place, and time.      Gait: Gait is intact.   Psychiatric:         Mood and Affect: Mood normal.         Thought Content: Thought content normal.         Judgment: Judgment normal.        Result Review :    CMP          9/6/2024    10:00 4/9/2025    09:02   CMP   Glucose 114  114    BUN 20  14    Creatinine 0.89  0.91    EGFR 70.7  68.9    Sodium 138  139    Potassium 4.8  4.3    Chloride 103  102    Calcium 9.8  10.0    Total Protein 7.3  7.4    Albumin 4.9  4.7    Globulin 2.4  2.7    Total Bilirubin 0.4  0.3    Alkaline Phosphatase 109  122    AST (SGOT) 27  28    ALT (SGPT) 35  32    Albumin/Globulin Ratio 2.0  1.7    BUN/Creatinine Ratio 22.5  15.4    Anion Gap 12.0  11.8      CBC          9/6/2024    10:00 4/9/2025    09:02   CBC   WBC 5.45  5.92    RBC 5.04  4.77    Hemoglobin 14.7  13.8    Hematocrit 43.8  41.6    MCV 86.9  87.2    MCH 29.2  28.9    MCHC 33.6  33.2    RDW 13.2  12.7    Platelets 286  274      Lipid Panel          9/6/2024    10:00 4/9/2025    09:02   Lipid Panel   Total Cholesterol 216  219    Triglycerides 112  151    HDL Cholesterol 76  73    VLDL Cholesterol 19  26    LDL Cholesterol  121  120    LDL/HDL Ratio 1.55  1.59      TSH          9/6/2024    10:00 4/9/2025    09:02   TSH   TSH 2.170  2.420      Most Recent A1C          4/9/2025    09:02   HGBA1C Most Recent   Hemoglobin A1C 5.70                Assessment and Plan   Diagnoses and all orders for this visit:    1. Primary hypertension (Primary)  Assessment & Plan:  Hypertension is uncontrolled  Decrease amlodipine to 10 mg half tablet daily.  Start metoprolol 25 mg extended release daily.  Medication changes per orders.  Weight loss.  Regular aerobic exercise.  Ambulatory blood pressure monitoring.  Follow-up at scheduled visit in 2 weeks..    Orders:  -     metoprolol succinate  XL (TOPROL-XL) 25 MG 24 hr tablet; Take 1 tablet by mouth Daily.  Dispense: 90 tablet; Refill: 0    2. Encounter for screening for vascular disease  -     CT Cardiac Calcium Score Without Dye; Future  -     Vascular Screening (Bundle) CAR; Future             Follow Up   Return for Next scheduled follow up.  Patient was given instructions and counseling regarding her condition or for health maintenance advice. Please see specific information pulled into the AVS if appropriate.

## 2025-06-03 NOTE — ASSESSMENT & PLAN NOTE
Hypertension is uncontrolled  Decrease amlodipine to 10 mg half tablet daily.  Start metoprolol 25 mg extended release daily.  Medication changes per orders.  Weight loss.  Regular aerobic exercise.  Ambulatory blood pressure monitoring.  Follow-up at scheduled visit in 2 weeks..

## 2025-06-11 DIAGNOSIS — I10 PRIMARY HYPERTENSION: ICD-10-CM

## 2025-06-11 DIAGNOSIS — F41.9 ANXIETY: ICD-10-CM

## 2025-06-11 RX ORDER — AMLODIPINE BESYLATE 5 MG/1
5 TABLET ORAL DAILY
Qty: 30 TABLET | Refills: 0 | OUTPATIENT
Start: 2025-06-11

## 2025-06-13 DIAGNOSIS — N95.1 MENOPAUSAL SYNDROME (HOT FLUSHES): ICD-10-CM

## 2025-06-15 RX ORDER — ESTRADIOL 0.05 MG/D
PATCH TRANSDERMAL
Qty: 4 PATCH | Refills: 0 | Status: SHIPPED | OUTPATIENT
Start: 2025-06-15

## 2025-06-16 DIAGNOSIS — E03.9 ACQUIRED HYPOTHYROIDISM: ICD-10-CM

## 2025-06-16 RX ORDER — LEVOTHYROXINE SODIUM 75 MCG
75 TABLET ORAL DAILY
Qty: 90 TABLET | Refills: 0 | Status: SHIPPED | OUTPATIENT
Start: 2025-06-16

## 2025-06-17 DIAGNOSIS — I10 PRIMARY HYPERTENSION: ICD-10-CM

## 2025-06-17 RX ORDER — AMLODIPINE BESYLATE 10 MG/1
10 TABLET ORAL DAILY
Qty: 30 TABLET | Refills: 0 | Status: SHIPPED | OUTPATIENT
Start: 2025-06-17 | End: 2025-06-18

## 2025-06-18 ENCOUNTER — OFFICE VISIT (OUTPATIENT)
Dept: FAMILY MEDICINE CLINIC | Facility: CLINIC | Age: 70
End: 2025-06-18
Payer: MEDICARE

## 2025-06-18 VITALS
SYSTOLIC BLOOD PRESSURE: 145 MMHG | DIASTOLIC BLOOD PRESSURE: 78 MMHG | HEART RATE: 80 BPM | BODY MASS INDEX: 26.02 KG/M2 | TEMPERATURE: 98.4 F | HEIGHT: 64 IN | OXYGEN SATURATION: 98 % | WEIGHT: 152.4 LBS | RESPIRATION RATE: 15 BRPM

## 2025-06-18 DIAGNOSIS — T88.7XXA MEDICATION SIDE EFFECT: ICD-10-CM

## 2025-06-18 DIAGNOSIS — I10 PRIMARY HYPERTENSION: Primary | ICD-10-CM

## 2025-06-18 PROBLEM — R03.0 ELEVATED BLOOD PRESSURE READING: Status: RESOLVED | Noted: 2024-06-17 | Resolved: 2025-06-18

## 2025-06-18 RX ORDER — METOPROLOL SUCCINATE 25 MG/1
50 TABLET, EXTENDED RELEASE ORAL DAILY
Start: 2025-06-18

## 2025-06-18 RX ADMIN — CYANOCOBALAMIN 1000 MCG: 1000 INJECTION, SOLUTION INTRAMUSCULAR; SUBCUTANEOUS at 15:04

## 2025-06-18 NOTE — PROGRESS NOTES
Subjective   Chief Complaint   Patient presents with    Hypertension    B12 Injection    Swelling around ankles     Courtney Reis is a 69 y.o. female.     Patient Care Team:  Effie Quinteros MD as PCP - General (Family Medicine)  Eugenie Sanchez MD as Consulting Physician (Rheumatology)  Anjana Sweet OD (Optometry)    History of Present Illness  She is coming in today to follow-up on her hypertension.  Patient was started on amlodipine a few months ago, her dose of amlodipine was increased from 5 mg to 10 mg about a month ago, later on she started noticing some swelling around her ankles with some redness.  She was seen in our office about 2 weeks ago by nurse practitioner and at that time she was advised to lower amlodipine from 10 mg to 5 mg and metoprolol 25 mg was started.  She reports that her swelling has improved and the redness has resolved, but the swelling is not completely gone.  She denies any chest pains or shortness of breath.  Patient also decided to proceed with a cardiovascular screening and she already scheduled that for September.       The following portions of the patient's history were reviewed and updated as appropriate: allergies, current medications, past family history, past medical history, past social history, past surgical history, and problem list.  Past Medical History:   Diagnosis Date    Allergic     Seasonal    Anemia     Arthritis     Cancer     Precancerous cells    Disease of thyroid gland     Fibromyalgia     Fibromyalgia, primary     Headache     History of medical problems     Lupus, pernicuous anemia    Hyperlipidemia     Hypothyroidism     Low back pain     Spinal stenosis    Lupus     Medicare annual wellness visit, subsequent 09/06/2024    Osteopenia     Pernicious anemia      Past Surgical History:   Procedure Laterality Date    ANKLE SURGERY      right ankle    COLONOSCOPY      HYSTERECTOMY      due to precancerous cells    LUMBAR SPINE SURGERY  2019     "fusion on few levels    LUMBAR SPINE SURGERY  2025    again fusion on several levels    SPINE SURGERY      Multi level fusion    TONSILLECTOMY       The patient has a family history of  Family History   Problem Relation Age of Onset    Breast cancer Mother     Arthritis Mother     Cancer Mother     Heart disease Mother     Hyperlipidemia Mother     Thyroid disease Mother     Vision loss Mother     Breast cancer Cousin     Colon cancer Brother     Cancer Father     Hyperlipidemia Father     Hypertension Father     Vision loss Father     Asthma Son     Asthma Daughter     Cancer Brother     Cancer Brother     COPD Brother     Early death Sister      Social History     Socioeconomic History    Marital status:    Tobacco Use    Smoking status: Former     Current packs/day: 0.00     Average packs/day: 0.3 packs/day for 2.3 years (0.6 ttl pk-yrs)     Types: Cigarettes     Quit date:      Years since quittin.4     Passive exposure: Never    Smokeless tobacco: Never    Tobacco comments:     Mid twenties social   Vaping Use    Vaping status: Never Used   Substance and Sexual Activity    Alcohol use: Yes     Alcohol/week: 1.0 standard drink of alcohol     Types: 1 Glasses of wine per week     Comment: Social drinking occassionally    Drug use: Never    Sexual activity: Yes     Partners: Male     Birth control/protection: Hysterectomy       Review of Systems   Constitutional:  Negative for activity change, fatigue and fever.   Respiratory:  Negative for wheezing.    Cardiovascular:  Positive for leg swelling.   Musculoskeletal:  Negative for arthralgias and back pain.   Skin:  Negative for rash.   Neurological:  Negative for tremors and headache.     Visit Vitals  /78 (BP Location: Right arm, Patient Position: Sitting, Cuff Size: Adult)   Pulse 80   Temp 98.4 °F (36.9 °C) (Infrared)   Resp 15   Ht 162.6 cm (64.02\")   Wt 69.1 kg (152 lb 6.4 oz)   SpO2 98%   BMI 26.15 kg/m²              Current " Outpatient Medications:     estradiol (CLIMARA) 0.05 MG/24HR patch, PLACE 1 PATCH ON THE SKIN EVERY WEEK, Disp: 4 patch, Rfl: 0    hydroxychloroquine (PLAQUENIL) 200 MG tablet, Take 1 tablet by mouth Daily. One and a half, Disp: , Rfl:     meloxicam (MOBIC) 7.5 MG tablet, TAKE 1 TABLET BY MOUTH DAILY, Disp: 90 tablet, Rfl: 0    metoprolol succinate XL (TOPROL-XL) 25 MG 24 hr tablet, Take 2 tablets by mouth Daily., Disp: , Rfl:     Synthroid 75 MCG tablet, TAKE 1 TABLET BY MOUTH DAILY, Disp: 90 tablet, Rfl: 0    tiZANidine (ZANAFLEX) 4 MG tablet, TAKE 1 TABLET BY MOUTH AT NIGHT AS NEEDED FOR MUSCLE SPASMS, Disp: 30 tablet, Rfl: 0    Current Facility-Administered Medications:     cyanocobalamin injection 1,000 mcg, 1,000 mcg, Intramuscular, Q28 Days, Effie Quinteros MD, 1,000 mcg at 06/18/25 1504    Objective   Physical Exam  Constitutional:       General: She is not in acute distress.     Appearance: Normal appearance. She is well-developed. She is not ill-appearing or diaphoretic.      Comments: Patient is in no distress, patient has normal voice and speech.  Normal respiratory effort.   HENT:      Head: Normocephalic and atraumatic.   Pulmonary:      Effort: Pulmonary effort is normal.   Musculoskeletal:      Cervical back: Normal range of motion and neck supple.      Comments: There is some mild swelling around both ankles noted.   Neurological:      General: No focal deficit present.      Mental Status: She is alert and oriented to person, place, and time. Mental status is at baseline.   Psychiatric:         Mood and Affect: Mood normal.         FOLLOWING LABS WERE REVIEWED TODAY:  CMP          9/6/2024    10:00 4/9/2025    09:02   CMP   Glucose 114  114    BUN 20  14    Creatinine 0.89  0.91    EGFR 70.7  68.9    Sodium 138  139    Potassium 4.8  4.3    Chloride 103  102    Calcium 9.8  10.0    Total Protein 7.3  7.4    Albumin 4.9  4.7    Globulin 2.4  2.7    Total Bilirubin 0.4  0.3    Alkaline Phosphatase  109  122    AST (SGOT) 27  28    ALT (SGPT) 35  32    Albumin/Globulin Ratio 2.0  1.7    BUN/Creatinine Ratio 22.5  15.4    Anion Gap 12.0  11.8            Assessment & Plan   Diagnoses and all orders for this visit:    1. Primary hypertension (Primary)  -     metoprolol succinate XL (TOPROL-XL) 25 MG 24 hr tablet; Take 2 tablets by mouth Daily.    2. Medication side effect      Her hypertension is still poorly controlled.  Patient also developed side effects to medication/amlodipine.  Patient's fluid retention improved with lowering amlodipine from 10 mg to 5 mg, but she still has some residual swelling.  I advised the patient to discontinue amlodipine and I will be increasing metoprolol from 25 mg to 50 mg.  Patient will continue to monitor her blood pressure at home.      Return in about 1 month (around 7/18/2025) for Next scheduled follow up.    Requested Prescriptions     Signed Prescriptions Disp Refills    metoprolol succinate XL (TOPROL-XL) 25 MG 24 hr tablet       Sig: Take 2 tablets by mouth Daily.       Effie Quinteros MD  06/18/2025  15:04 EDT

## 2025-06-18 NOTE — PROGRESS NOTES
Injection  Injection performed in Left Deltoid by Jacklyn Simon MA. Patient tolerated the procedure well without complications.  06/18/25   Jacklyn Simon MA

## 2025-07-02 ENCOUNTER — TELEPHONE (OUTPATIENT)
Dept: FAMILY MEDICINE CLINIC | Facility: CLINIC | Age: 70
End: 2025-07-02

## 2025-07-02 RX ORDER — LOSARTAN POTASSIUM 50 MG/1
50 TABLET ORAL DAILY
Qty: 30 TABLET | Refills: 0 | Status: SHIPPED | OUTPATIENT
Start: 2025-07-02

## 2025-07-02 NOTE — TELEPHONE ENCOUNTER
Caller: Courtney Reis    Relationship: Self    Best call back number: [phone]    What medication are you requesting: LOSARTAN     What are your current symptoms: BP     How long have you been experiencing symptoms:     Have you had these symptoms before:      [x] Yes  [] No    Have you been treated for these symptoms before:     [x] Yes  [] No    If a prescription is needed, what is your preferred pharmacy and phone number: [id number] - [place], [place] - [address] - [phone]  - [phone]     Additional notes:  PATIENT IS OKAY WITH MOVING FORWARD WITH THIS MEDICATION

## 2025-07-13 DIAGNOSIS — N95.1 MENOPAUSAL SYNDROME (HOT FLUSHES): ICD-10-CM

## 2025-07-13 RX ORDER — ESTRADIOL 0.05 MG/D
PATCH TRANSDERMAL
Qty: 4 PATCH | Refills: 0 | Status: SHIPPED | OUTPATIENT
Start: 2025-07-13

## 2025-07-28 ENCOUNTER — OFFICE VISIT (OUTPATIENT)
Dept: FAMILY MEDICINE CLINIC | Facility: CLINIC | Age: 70
End: 2025-07-28
Payer: MEDICARE

## 2025-07-28 VITALS
TEMPERATURE: 98 F | SYSTOLIC BLOOD PRESSURE: 126 MMHG | WEIGHT: 155.3 LBS | DIASTOLIC BLOOD PRESSURE: 78 MMHG | HEIGHT: 64 IN | BODY MASS INDEX: 26.51 KG/M2 | RESPIRATION RATE: 16 BRPM | OXYGEN SATURATION: 98 % | HEART RATE: 76 BPM

## 2025-07-28 DIAGNOSIS — E53.8 B12 DEFICIENCY: ICD-10-CM

## 2025-07-28 DIAGNOSIS — M51.369 DEGENERATION OF INTERVERTEBRAL DISC OF LUMBAR REGION WITHOUT DISCOGENIC BACK PAIN OR LOWER EXTREMITY PAIN: ICD-10-CM

## 2025-07-28 DIAGNOSIS — I10 PRIMARY HYPERTENSION: Primary | ICD-10-CM

## 2025-07-28 DIAGNOSIS — M32.9 SYSTEMIC LUPUS ERYTHEMATOSUS, UNSPECIFIED SLE TYPE, UNSPECIFIED ORGAN INVOLVEMENT STATUS: ICD-10-CM

## 2025-07-28 PROCEDURE — 1159F MED LIST DOCD IN RCRD: CPT | Performed by: FAMILY MEDICINE

## 2025-07-28 PROCEDURE — 3074F SYST BP LT 130 MM HG: CPT | Performed by: FAMILY MEDICINE

## 2025-07-28 PROCEDURE — 1126F AMNT PAIN NOTED NONE PRSNT: CPT | Performed by: FAMILY MEDICINE

## 2025-07-28 PROCEDURE — 1160F RVW MEDS BY RX/DR IN RCRD: CPT | Performed by: FAMILY MEDICINE

## 2025-07-28 PROCEDURE — 99214 OFFICE O/P EST MOD 30 MIN: CPT | Performed by: FAMILY MEDICINE

## 2025-07-28 PROCEDURE — 3078F DIAST BP <80 MM HG: CPT | Performed by: FAMILY MEDICINE

## 2025-07-28 PROCEDURE — 96372 THER/PROPH/DIAG INJ SC/IM: CPT | Performed by: FAMILY MEDICINE

## 2025-07-28 RX ORDER — MELOXICAM 7.5 MG/1
7.5 TABLET ORAL DAILY
Qty: 90 TABLET | Refills: 0 | OUTPATIENT
Start: 2025-07-28

## 2025-07-28 RX ORDER — CYANOCOBALAMIN 1000 UG/ML
1000 INJECTION, SOLUTION INTRAMUSCULAR; SUBCUTANEOUS
Status: SHIPPED | OUTPATIENT
Start: 2025-07-28

## 2025-07-28 RX ORDER — LOSARTAN POTASSIUM 50 MG/1
50 TABLET ORAL DAILY
Qty: 90 TABLET | Refills: 0 | Status: SHIPPED | OUTPATIENT
Start: 2025-07-28

## 2025-07-28 RX ORDER — MELOXICAM 7.5 MG/1
7.5 TABLET ORAL DAILY
Qty: 90 TABLET | Refills: 0 | Status: SHIPPED | OUTPATIENT
Start: 2025-07-28

## 2025-07-28 RX ADMIN — CYANOCOBALAMIN 1000 MCG: 1000 INJECTION, SOLUTION INTRAMUSCULAR; SUBCUTANEOUS at 09:30

## 2025-07-28 NOTE — PROGRESS NOTES
Injection performed in RIGHT DELTOID by Orquidea Hammer. Patient tolerated the procedure well without complications.    Patient Supplied: no

## 2025-07-28 NOTE — PROGRESS NOTES
Subjective   Chief Complaint   Patient presents with    Hypertension    Med Refill    Arthritis     Courtney Reis is a 69 y.o. female.     Patient Care Team:  Effie Quinteros MD as PCP - General (Family Medicine)  Eugenie Sanchez MD as Consulting Physician (Rheumatology)  Anjana Sweet OD (Optometry)    History of Present Illness     History of Present Illness  The patient presents for hypertension management, post-surgical follow-up, and medication management. She reports that her blood pressure has been lower than usual, with some readings in the 140s. She is currently on losartan 50 mg, which she tolerates well without any side effects. Previous attempts to manage her blood pressure with amlodipine and metoprolol were unsuccessful as patient had some side effects.  She underwent lumbar spine surgery on 03/03/2025 and had a follow-up appointment with her surgeon in 07/2025. She was advised to avoid heavy lifting but was cleared to bend. Her next appointment with the surgeon is scheduled for 10/2025. She experienced less pain immediately after the surgery compared to now, attributing the current discomfort to increased muscle use. She recently returned from a trip to [place] where she engaged in hiking activities. She is making efforts to resume her normal lifestyle. She has a history of back issues in her family and understands that fusion surgery could potentially lead to further complications. However, she feels that her condition has improved since the surgery. She was diagnosed with spondylolisthesis and a disc pressing into her spinal cord, which was causing neurological issues. Post-surgery, she no longer experiences pain in her leg and hips. She recalls being in severe pain and having difficulty walking prior to the first surgery. She also mentions that her mother had scoliosis and was severely bent for the last 10 to 12 years of her life.  She is on hormone replacement therapy with a patch and  has an appointment in 09/2025 with Dr. Alan's nurse practitioner. She had a hysterectomy a long time ago and has been on the patches for years.      She is on meloxicam 7.5 mg and needs a refill. She has been off of it a couple of times for a couple of weeks when the doctor was out of town or something happened and she could definitely tell the difference.  She is followed by rheumatologist for the management of her lupus.    PAST SURGICAL HISTORY:  - Hysterectomy (date not specified)  - Spinal fusion surgery (07/2020)  - Spinal fusion surgery (03/03/2025)    FAMILY HISTORY  She has a family history of back issues.  Her mother had severe scoliosis and was almost bent for the last 10-12 years of her life.    The following portions of the patient's history were reviewed and updated as appropriate: allergies, current medications, past family history, past medical history, past social history, past surgical history, and problem list.  Past Medical History:   Diagnosis Date    Allergic     Seasonal    Anemia     Arthritis     Cancer     Precancerous cells    Disease of thyroid gland     Fibromyalgia     Fibromyalgia, primary     Headache     History of medical problems     Lupus, pernicuous anemia    Hyperlipidemia     Hypothyroidism     Low back pain     Spinal stenosis    Lupus     Medicare annual wellness visit, subsequent 09/06/2024    Osteopenia     Pernicious anemia      Past Surgical History:   Procedure Laterality Date    ANKLE SURGERY      right ankle    COLONOSCOPY      HYSTERECTOMY      due to precancerous cells    LUMBAR SPINE SURGERY  2019    fusion on few levels    LUMBAR SPINE SURGERY  03/03/2025    again fusion on several levels    SPINE SURGERY      Multi level fusion    TONSILLECTOMY       The patient has a family history of  Family History   Problem Relation Age of Onset    Breast cancer Mother     Arthritis Mother     Cancer Mother     Heart disease Mother     Hyperlipidemia Mother     Thyroid  "disease Mother     Vision loss Mother     Breast cancer Cousin     Colon cancer Brother     Cancer Father     Hyperlipidemia Father     Hypertension Father     Vision loss Father     Asthma Son     Asthma Daughter     Cancer Brother     Cancer Brother     COPD Brother     Early death Sister      Social History     Socioeconomic History    Marital status:    Tobacco Use    Smoking status: Former     Current packs/day: 0.00     Average packs/day: 0.3 packs/day for 2.3 years (0.6 ttl pk-yrs)     Types: Cigarettes     Quit date:      Years since quittin.6     Passive exposure: Never    Smokeless tobacco: Never    Tobacco comments:     Mid twenties social   Vaping Use    Vaping status: Never Used   Substance and Sexual Activity    Alcohol use: Yes     Alcohol/week: 1.0 standard drink of alcohol     Types: 1 Glasses of wine per week     Comment: Social drinking occassionally    Drug use: Never    Sexual activity: Yes     Partners: Male     Birth control/protection: Hysterectomy       Review of Systems   Constitutional:  Negative for activity change, fatigue and fever.   Respiratory:  Negative for shortness of breath and wheezing.    Cardiovascular:  Negative for chest pain, palpitations and leg swelling.   Musculoskeletal:  Positive for arthralgias and back pain.   Skin:  Negative for rash.   Neurological:  Negative for tremors and headache.     Visit Vitals  /78 (BP Location: Left arm, Patient Position: Sitting, Cuff Size: Adult)   Pulse 76   Temp 98 °F (36.7 °C) (Temporal)   Resp 16   Ht 162.6 cm (64\")   Wt 70.4 kg (155 lb 4.8 oz)   SpO2 98%   BMI 26.66 kg/m²              Current Outpatient Medications:     estradiol (CLIMARA) 0.05 MG/24HR patch, PLACE 1 PATCH ON THE SKIN EVERY WEEK, Disp: 4 patch, Rfl: 0    hydroxychloroquine (PLAQUENIL) 200 MG tablet, Take 1 tablet by mouth Daily. One and a half, Disp: , Rfl:     losartan (Cozaar) 50 MG tablet, Take 1 tablet by mouth Daily., Disp: 90 tablet, Rfl: " 0    meloxicam (MOBIC) 7.5 MG tablet, Take 1 tablet by mouth Daily., Disp: 90 tablet, Rfl: 0    Synthroid 75 MCG tablet, TAKE 1 TABLET BY MOUTH DAILY, Disp: 90 tablet, Rfl: 0    tiZANidine (ZANAFLEX) 4 MG tablet, TAKE 1 TABLET BY MOUTH AT NIGHT AS NEEDED FOR MUSCLE SPASMS, Disp: 30 tablet, Rfl: 0    Current Facility-Administered Medications:     cyanocobalamin injection 1,000 mcg, 1,000 mcg, Intramuscular, Q28 Days, Effie Quinteros MD, 1,000 mcg at 06/18/25 1504    cyanocobalamin injection 1,000 mcg, 1,000 mcg, Intramuscular, Q28 Days, Effie Quinteros MD, 1,000 mcg at 07/28/25 0930    Objective   Physical Exam  Constitutional:       General: She is not in acute distress.     Appearance: Normal appearance. She is well-developed. She is not ill-appearing or diaphoretic.      Comments: Patient is in no distress, patient has normal voice and speech.  Normal respiratory effort.   HENT:      Head: Normocephalic and atraumatic.   Pulmonary:      Effort: Pulmonary effort is normal.   Musculoskeletal:      Cervical back: Normal range of motion and neck supple.   Neurological:      General: No focal deficit present.      Mental Status: She is alert and oriented to person, place, and time. Mental status is at baseline.   Psychiatric:         Mood and Affect: Mood normal.         FOLLOWING LABS WERE REVIEWED TODAY:  CMP          9/6/2024    10:00 4/9/2025    09:02   CMP   Glucose 114  114    BUN 20  14    Creatinine 0.89  0.91    EGFR 70.7  68.9    Sodium 138  139    Potassium 4.8  4.3    Chloride 103  102    Calcium 9.8  10.0    Total Protein 7.3  7.4    Albumin 4.9  4.7    Globulin 2.4  2.7    Total Bilirubin 0.4  0.3    Alkaline Phosphatase 109  122    AST (SGOT) 27  28    ALT (SGPT) 35  32    Albumin/Globulin Ratio 2.0  1.7    BUN/Creatinine Ratio 22.5  15.4    Anion Gap 12.0  11.8            Assessment & Plan   Diagnoses and all orders for this visit:    1. Primary hypertension (Primary)  -     losartan (Cozaar) 50 MG  tablet; Take 1 tablet by mouth Daily.  Dispense: 90 tablet; Refill: 0    2. Systemic lupus erythematosus, unspecified SLE type, unspecified organ involvement status  -     meloxicam (MOBIC) 7.5 MG tablet; Take 1 tablet by mouth Daily.  Dispense: 90 tablet; Refill: 0    3. Degeneration of intervertebral disc of lumbar region without discogenic back pain or lower extremity pain    4. B12 deficiency  -     cyanocobalamin injection 1,000 mcg        Her hypertension now is very well-controlled.  Patient tolerates losartan well.  Refill was given.  We also addressed her chronic lower back pain as well as lupus.  Patient has been recovering well from her lumbar spine surgery and she has a follow-up appointment with her surgeon later in the fall.  Medication refill was given.  Patient will be also scheduling her Medicare wellness exam with me down the road.  She was also given her monthly vitamin B12 shot.      Return in about 2 months (around 9/28/2025) for Medicare Wellness.    Requested Prescriptions     Signed Prescriptions Disp Refills    losartan (Cozaar) 50 MG tablet 90 tablet 0     Sig: Take 1 tablet by mouth Daily.    meloxicam (MOBIC) 7.5 MG tablet 90 tablet 0     Sig: Take 1 tablet by mouth Daily.       Effie Quinteros MD  07/28/2025  09:22 EDT      Patient or patient representative verbalized consent for the use of Ambient Listening during the visit with  Effie Quinteros MD for chart documentation. 7/28/2025  09:22 EDT

## 2025-07-30 DIAGNOSIS — I10 PRIMARY HYPERTENSION: ICD-10-CM

## 2025-07-30 RX ORDER — LOSARTAN POTASSIUM 50 MG/1
50 TABLET ORAL DAILY
Qty: 30 TABLET | OUTPATIENT
Start: 2025-07-30

## 2025-08-17 DIAGNOSIS — N95.1 MENOPAUSAL SYNDROME (HOT FLUSHES): ICD-10-CM

## 2025-08-17 RX ORDER — ESTRADIOL 0.05 MG/D
PATCH TRANSDERMAL
Qty: 4 PATCH | Refills: 0 | Status: SHIPPED | OUTPATIENT
Start: 2025-08-17